# Patient Record
Sex: MALE | Race: WHITE | NOT HISPANIC OR LATINO | Employment: PART TIME | URBAN - METROPOLITAN AREA
[De-identification: names, ages, dates, MRNs, and addresses within clinical notes are randomized per-mention and may not be internally consistent; named-entity substitution may affect disease eponyms.]

---

## 2017-03-06 ENCOUNTER — TRANSCRIBE ORDERS (OUTPATIENT)
Dept: ADMINISTRATIVE | Age: 67
End: 2017-03-06

## 2017-03-06 ENCOUNTER — APPOINTMENT (OUTPATIENT)
Dept: LAB | Age: 67
End: 2017-03-06
Payer: COMMERCIAL

## 2017-03-06 DIAGNOSIS — E78.2 MIXED HYPERLIPIDEMIA: ICD-10-CM

## 2017-03-06 DIAGNOSIS — E78.2 MIXED HYPERLIPIDEMIA: Primary | ICD-10-CM

## 2017-03-06 LAB
ALBUMIN SERPL BCP-MCNC: 3.7 G/DL (ref 3.5–5)
ALP SERPL-CCNC: 85 U/L (ref 46–116)
ALT SERPL W P-5'-P-CCNC: 47 U/L (ref 12–78)
ANION GAP SERPL CALCULATED.3IONS-SCNC: 7 MMOL/L (ref 4–13)
AST SERPL W P-5'-P-CCNC: 22 U/L (ref 5–45)
BILIRUB SERPL-MCNC: 0.34 MG/DL (ref 0.2–1)
BUN SERPL-MCNC: 14 MG/DL (ref 5–25)
CALCIUM SERPL-MCNC: 9.5 MG/DL (ref 8.3–10.1)
CHLORIDE SERPL-SCNC: 102 MMOL/L (ref 100–108)
CHOLEST SERPL-MCNC: 163 MG/DL (ref 50–200)
CO2 SERPL-SCNC: 28 MMOL/L (ref 21–32)
CREAT SERPL-MCNC: 0.93 MG/DL (ref 0.6–1.3)
GFR SERPL CREATININE-BSD FRML MDRD: >60 ML/MIN/1.73SQ M
GLUCOSE SERPL-MCNC: 91 MG/DL (ref 65–140)
HDLC SERPL-MCNC: 39 MG/DL (ref 40–60)
LDLC SERPL CALC-MCNC: 103 MG/DL (ref 0–100)
POTASSIUM SERPL-SCNC: 4.1 MMOL/L (ref 3.5–5.3)
PROT SERPL-MCNC: 7.8 G/DL (ref 6.4–8.2)
SODIUM SERPL-SCNC: 137 MMOL/L (ref 136–145)
TRIGL SERPL-MCNC: 103 MG/DL

## 2017-03-06 PROCEDURE — 80053 COMPREHEN METABOLIC PANEL: CPT

## 2017-03-06 PROCEDURE — 36415 COLL VENOUS BLD VENIPUNCTURE: CPT

## 2017-03-06 PROCEDURE — 80061 LIPID PANEL: CPT

## 2017-11-09 ENCOUNTER — APPOINTMENT (OUTPATIENT)
Dept: LAB | Age: 67
End: 2017-11-09
Payer: COMMERCIAL

## 2017-11-09 ENCOUNTER — TRANSCRIBE ORDERS (OUTPATIENT)
Dept: ADMINISTRATIVE | Age: 67
End: 2017-11-09

## 2017-11-09 DIAGNOSIS — E29.1 3-OXO-5 ALPHA-STEROID DELTA 4-DEHYDROGENASE DEFICIENCY: ICD-10-CM

## 2017-11-09 DIAGNOSIS — E29.1 3-OXO-5 ALPHA-STEROID DELTA 4-DEHYDROGENASE DEFICIENCY: Primary | ICD-10-CM

## 2017-11-09 LAB
ALBUMIN SERPL BCP-MCNC: 3.7 G/DL (ref 3.5–5)
ALP SERPL-CCNC: 84 U/L (ref 46–116)
ALT SERPL W P-5'-P-CCNC: 87 U/L (ref 12–78)
ANION GAP SERPL CALCULATED.3IONS-SCNC: 7 MMOL/L (ref 4–13)
AST SERPL W P-5'-P-CCNC: 61 U/L (ref 5–45)
BILIRUB DIRECT SERPL-MCNC: 0.11 MG/DL (ref 0–0.2)
BILIRUB SERPL-MCNC: 0.37 MG/DL (ref 0.2–1)
BUN SERPL-MCNC: 19 MG/DL (ref 5–25)
CALCIUM SERPL-MCNC: 9.1 MG/DL (ref 8.3–10.1)
CHLORIDE SERPL-SCNC: 104 MMOL/L (ref 100–108)
CHOLEST SERPL-MCNC: 197 MG/DL (ref 50–200)
CO2 SERPL-SCNC: 27 MMOL/L (ref 21–32)
CREAT SERPL-MCNC: 0.95 MG/DL (ref 0.6–1.3)
ERYTHROCYTE [DISTWIDTH] IN BLOOD BY AUTOMATED COUNT: 13.5 % (ref 11.6–15.1)
FSH SERPL-ACNC: 4.9 MIU/ML (ref 0.7–10.8)
GFR SERPL CREATININE-BSD FRML MDRD: 82 ML/MIN/1.73SQ M
GLUCOSE P FAST SERPL-MCNC: 84 MG/DL (ref 65–99)
HCT VFR BLD AUTO: 45.1 % (ref 36.5–49.3)
HDLC SERPL-MCNC: 34 MG/DL (ref 40–60)
HGB BLD-MCNC: 14.6 G/DL (ref 12–17)
LDLC SERPL CALC-MCNC: 136 MG/DL (ref 0–100)
LH SERPL-ACNC: 5.3 MIU/ML (ref 1.2–10.6)
MCH RBC QN AUTO: 26.7 PG (ref 26.8–34.3)
MCHC RBC AUTO-ENTMCNC: 32.4 G/DL (ref 31.4–37.4)
MCV RBC AUTO: 83 FL (ref 82–98)
PLATELET # BLD AUTO: 172 THOUSANDS/UL (ref 149–390)
PMV BLD AUTO: 11.6 FL (ref 8.9–12.7)
POTASSIUM SERPL-SCNC: 4.7 MMOL/L (ref 3.5–5.3)
PROLACTIN SERPL-MCNC: 5.3 NG/ML (ref 2.5–17.4)
PROT SERPL-MCNC: 7.6 G/DL (ref 6.4–8.2)
PSA SERPL-MCNC: 0.8 NG/ML (ref 0–4)
RBC # BLD AUTO: 5.46 MILLION/UL (ref 3.88–5.62)
SODIUM SERPL-SCNC: 138 MMOL/L (ref 136–145)
TRIGL SERPL-MCNC: 134 MG/DL
WBC # BLD AUTO: 3.66 THOUSAND/UL (ref 4.31–10.16)

## 2017-11-09 PROCEDURE — 84270 ASSAY OF SEX HORMONE GLOBUL: CPT

## 2017-11-09 PROCEDURE — 80076 HEPATIC FUNCTION PANEL: CPT

## 2017-11-09 PROCEDURE — 84153 ASSAY OF PSA TOTAL: CPT

## 2017-11-09 PROCEDURE — 83001 ASSAY OF GONADOTROPIN (FSH): CPT

## 2017-11-09 PROCEDURE — 84403 ASSAY OF TOTAL TESTOSTERONE: CPT

## 2017-11-09 PROCEDURE — 80061 LIPID PANEL: CPT

## 2017-11-09 PROCEDURE — 83002 ASSAY OF GONADOTROPIN (LH): CPT

## 2017-11-09 PROCEDURE — 80048 BASIC METABOLIC PNL TOTAL CA: CPT

## 2017-11-09 PROCEDURE — 84402 ASSAY OF FREE TESTOSTERONE: CPT

## 2017-11-09 PROCEDURE — 84146 ASSAY OF PROLACTIN: CPT

## 2017-11-09 PROCEDURE — 85027 COMPLETE CBC AUTOMATED: CPT

## 2017-11-09 PROCEDURE — 36415 COLL VENOUS BLD VENIPUNCTURE: CPT

## 2017-11-10 LAB — SHBG SERPL-SCNC: 60.7 NMOL/L (ref 19.3–76.4)

## 2017-11-11 LAB
TESTOST FREE SERPL-MCNC: 12.3 PG/ML (ref 6.6–18.1)
TESTOST SERPL-MCNC: 712 NG/DL (ref 264–916)

## 2019-02-13 ENCOUNTER — LAB (OUTPATIENT)
Dept: LAB | Facility: CLINIC | Age: 69
End: 2019-02-13
Payer: COMMERCIAL

## 2019-02-13 ENCOUNTER — OFFICE VISIT (OUTPATIENT)
Dept: ENDOCRINOLOGY | Facility: CLINIC | Age: 69
End: 2019-02-13
Payer: COMMERCIAL

## 2019-02-13 VITALS
BODY MASS INDEX: 35.57 KG/M2 | SYSTOLIC BLOOD PRESSURE: 148 MMHG | HEART RATE: 91 BPM | HEIGHT: 71 IN | DIASTOLIC BLOOD PRESSURE: 100 MMHG

## 2019-02-13 DIAGNOSIS — E23.0 HYPOGONADOTROPIC HYPOGONADISM (HCC): Primary | ICD-10-CM

## 2019-02-13 DIAGNOSIS — N62 GYNECOMASTIA: ICD-10-CM

## 2019-02-13 DIAGNOSIS — E55.9 VITAMIN D DEFICIENCY: ICD-10-CM

## 2019-02-13 DIAGNOSIS — G47.33 OBSTRUCTIVE SLEEP APNEA: ICD-10-CM

## 2019-02-13 DIAGNOSIS — E23.0 HYPOGONADOTROPIC HYPOGONADISM (HCC): ICD-10-CM

## 2019-02-13 LAB
25(OH)D3 SERPL-MCNC: 27.9 NG/ML (ref 30–100)
ALBUMIN SERPL BCP-MCNC: 3.9 G/DL (ref 3.5–5)
ALP SERPL-CCNC: 91 U/L (ref 46–116)
ALT SERPL W P-5'-P-CCNC: 114 U/L (ref 12–78)
ANION GAP SERPL CALCULATED.3IONS-SCNC: 5 MMOL/L (ref 4–13)
AST SERPL W P-5'-P-CCNC: 67 U/L (ref 5–45)
BASOPHILS # BLD AUTO: 0.03 THOUSANDS/ΜL (ref 0–0.1)
BASOPHILS NFR BLD AUTO: 1 % (ref 0–1)
BILIRUB SERPL-MCNC: 0.52 MG/DL (ref 0.2–1)
BUN SERPL-MCNC: 14 MG/DL (ref 5–25)
CALCIUM SERPL-MCNC: 9.4 MG/DL (ref 8.3–10.1)
CHLORIDE SERPL-SCNC: 104 MMOL/L (ref 100–108)
CHOLEST SERPL-MCNC: 192 MG/DL (ref 50–200)
CO2 SERPL-SCNC: 29 MMOL/L (ref 21–32)
CREAT SERPL-MCNC: 1 MG/DL (ref 0.6–1.3)
EOSINOPHIL # BLD AUTO: 0.06 THOUSAND/ΜL (ref 0–0.61)
EOSINOPHIL NFR BLD AUTO: 1 % (ref 0–6)
ERYTHROCYTE [DISTWIDTH] IN BLOOD BY AUTOMATED COUNT: 13.2 % (ref 11.6–15.1)
ESTRADIOL SERPL-MCNC: 63 PG/ML (ref 11–52.5)
GFR SERPL CREATININE-BSD FRML MDRD: 77 ML/MIN/1.73SQ M
GLUCOSE P FAST SERPL-MCNC: 102 MG/DL (ref 65–99)
HCT VFR BLD AUTO: 48.6 % (ref 36.5–49.3)
HDLC SERPL-MCNC: 35 MG/DL (ref 40–60)
HGB BLD-MCNC: 15.5 G/DL (ref 12–17)
IMM GRANULOCYTES # BLD AUTO: 0 THOUSAND/UL (ref 0–0.2)
IMM GRANULOCYTES NFR BLD AUTO: 0 % (ref 0–2)
LDLC SERPL CALC-MCNC: 136 MG/DL (ref 0–100)
LYMPHOCYTES # BLD AUTO: 1.42 THOUSANDS/ΜL (ref 0.6–4.47)
LYMPHOCYTES NFR BLD AUTO: 32 % (ref 14–44)
MCH RBC QN AUTO: 26.3 PG (ref 26.8–34.3)
MCHC RBC AUTO-ENTMCNC: 31.9 G/DL (ref 31.4–37.4)
MCV RBC AUTO: 82 FL (ref 82–98)
MONOCYTES # BLD AUTO: 0.46 THOUSAND/ΜL (ref 0.17–1.22)
MONOCYTES NFR BLD AUTO: 10 % (ref 4–12)
NEUTROPHILS # BLD AUTO: 2.51 THOUSANDS/ΜL (ref 1.85–7.62)
NEUTS SEG NFR BLD AUTO: 56 % (ref 43–75)
NONHDLC SERPL-MCNC: 157 MG/DL
NRBC BLD AUTO-RTO: 0 /100 WBCS
PLATELET # BLD AUTO: 181 THOUSANDS/UL (ref 149–390)
PMV BLD AUTO: 11.6 FL (ref 8.9–12.7)
POTASSIUM SERPL-SCNC: 4.6 MMOL/L (ref 3.5–5.3)
PROT SERPL-MCNC: 8 G/DL (ref 6.4–8.2)
PSA SERPL-MCNC: 0.9 NG/ML (ref 0–4)
RBC # BLD AUTO: 5.9 MILLION/UL (ref 3.88–5.62)
SODIUM SERPL-SCNC: 138 MMOL/L (ref 136–145)
T4 FREE SERPL-MCNC: 0.75 NG/DL (ref 0.76–1.46)
TRIGL SERPL-MCNC: 103 MG/DL
TSH SERPL DL<=0.05 MIU/L-ACNC: 1.85 UIU/ML (ref 0.36–3.74)
WBC # BLD AUTO: 4.48 THOUSAND/UL (ref 4.31–10.16)

## 2019-02-13 PROCEDURE — 36415 COLL VENOUS BLD VENIPUNCTURE: CPT

## 2019-02-13 PROCEDURE — 84403 ASSAY OF TOTAL TESTOSTERONE: CPT

## 2019-02-13 PROCEDURE — 84439 ASSAY OF FREE THYROXINE: CPT

## 2019-02-13 PROCEDURE — 84402 ASSAY OF FREE TESTOSTERONE: CPT

## 2019-02-13 PROCEDURE — 84443 ASSAY THYROID STIM HORMONE: CPT

## 2019-02-13 PROCEDURE — 80053 COMPREHEN METABOLIC PANEL: CPT

## 2019-02-13 PROCEDURE — 80061 LIPID PANEL: CPT

## 2019-02-13 PROCEDURE — 82306 VITAMIN D 25 HYDROXY: CPT

## 2019-02-13 PROCEDURE — 85025 COMPLETE CBC W/AUTO DIFF WBC: CPT

## 2019-02-13 PROCEDURE — G0103 PSA SCREENING: HCPCS

## 2019-02-13 PROCEDURE — 82670 ASSAY OF TOTAL ESTRADIOL: CPT

## 2019-02-13 PROCEDURE — 99204 OFFICE O/P NEW MOD 45 MIN: CPT | Performed by: INTERNAL MEDICINE

## 2019-02-13 RX ORDER — METOPROLOL SUCCINATE 25 MG/1
200 TABLET, EXTENDED RELEASE ORAL
COMMUNITY
Start: 2018-12-27

## 2019-02-13 NOTE — PROGRESS NOTES
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[facility] )  He has been on Clomid for 3 to 4 years  Check testosterone and estradiol today  I have asked him to continue Clomid 25 mg per day  He would like to keep his testosterone level around 700 and his estradiol level around 30  If the estradiol level is greater than 60, I would consider using Arimidex  Obstructive sleep apnea  Based on his neck size and stop [employer], he has obstructive sleep apnea  I have referred him to the sleep specialist     Vitamin D deficiency  Check 25 hydroxy vitamin-D level  Gynecomastia  There is small amount of gynecomastia present left greater than right  Continue to monitor over time  Diagnoses and all orders for this visit:    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[facility] )  -     PSA, total screen Lab Collect; Future  -     Lipid panel Lab Collect Lab Collect; Future  -     Comprehensive metabolic panel Lab Collect; Future  -     CBC and differential Lab Collect; Future  -     Estradiol; Future  -     Ambulatory referral to Internal Medicine; Future  -     clomiPHENE (CLOMID) 50 mg tablet; Take 0 5 tablets (25 mg total) by mouth daily    Obstructive sleep apnea  -     Ambulatory referral to Sleep Medicine; Future  -     Ambulatory referral to Internal Medicine; Future    Vitamin D deficiency  -     Vitamin D 25 hydroxy Lab Collect; Future  -     Ambulatory referral to Internal Medicine; Future    Gynecomastia  -     Testosterone, free, total Lab Collect; Future  -     T4, free Lab Collect; Future  -     TSH, 3rd generation Lab Collect; Future  -     Estradiol; Future  -     Ambulatory referral to Internal Medicine; Future    Other orders  -     Discontinue: clomiPHENE (CLOMID) 50 mg tablet; Take 50 mg by mouth  -     metoprolol succinate (TOPROL-XL) 25 mg 24 hr tablet; TAKE 1 TABLET BY MOUTH EVERY DAY  -     Coenzyme Q10-Vitamin E 100-100 MG-UNIT CAPS;  Take 100-200 mg by mouth          Subjective:      Patient ID: Yuri Arzate is a 76 y o  male  69-year-old male with hypogonadotropic hypogonadism for 7 to 8 years which has been treated with Clomid for about four years  He is currently taking 50 mg every other day  He denies any difficulty with erectile dysfunction  He is concerned about elevated estradiol levels which can occur with increasing testosterone  He also states that he has some gynecomastia although this does not appear to be bothersome  He denies any difficulty with endurance and he does exercise on a regular basis  In terms of sleep apnea, his neck circumference is almost 19 in  He does snore and has nightmares  The following portions of the patient's history were reviewed and updated as appropriate: allergies, current medications, past family history, past medical history, past social history, past surgical history and problem list     Review of Systems   Constitutional: Negative for chills and fever  Respiratory: Negative for shortness of breath  Cardiovascular: Negative for chest pain  Gastrointestinal: Negative for constipation, diarrhea, nausea and vomiting  Endocrine: Negative for polydipsia and polyuria  Neurological: Negative for numbness  All other systems reviewed and are negative  Objective:      /100   Pulse 91   Ht 5' 11" (1 803 m)   BMI 35 57 kg/m²          Physical Exam   Constitutional: He is oriented to person, place, and time  He appears well-developed and well-nourished  No distress  HENT:   Head: Normocephalic and atraumatic  Mouth/Throat: Oropharynx is clear and moist and mucous membranes are normal  No oropharyngeal exudate  Eyes: Conjunctivae, EOM and lids are normal  Right eye exhibits no discharge  Left eye exhibits no discharge  No scleral icterus  Neck: Neck supple  No thyromegaly present  Cardiovascular: Normal rate, regular rhythm and normal heart sounds  Exam reveals no gallop and no friction rub     No murmur heard   Pulmonary/Chest: Effort normal and breath sounds normal  No respiratory distress  He has no wheezes  He does have gynecomastia left greater than right  Abdominal: Soft  Bowel sounds are normal  He exhibits no distension  There is no tenderness  Musculoskeletal: Normal range of motion  He exhibits no edema, tenderness or deformity  Lymphadenopathy:        Head (right side): No occipital adenopathy present  Head (left side): No occipital adenopathy present  Right: No supraclavicular adenopathy present  Left: No supraclavicular adenopathy present  Neurological: He is alert and oriented to person, place, and time  No cranial nerve deficit  Coordination normal    Skin: Skin is warm and intact  No rash noted  He is not diaphoretic  No erythema  He has multiple skin tags  Psychiatric: He has a normal mood and affect  His behavior is normal    Vitals reviewed

## 2019-02-13 NOTE — ASSESSMENT & PLAN NOTE
Based on his neck size and stop [employer], he has obstructive sleep apnea    I have referred him to the sleep specialist

## 2019-02-13 NOTE — ASSESSMENT & PLAN NOTE
There is small amount of gynecomastia present left greater than right  Continue to monitor over time

## 2019-02-13 NOTE — LETTER
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[facility] )  He has been on Clomid for 3 to 4 years  Check testosterone and estradiol today  I have asked him to continue Clomid 25 mg per day  He would like to keep his testosterone level around 700 and his estradiol level around 30  If the estradiol level is greater than 60, I would consider using Arimidex  Obstructive sleep apnea  Based on his neck size and stop [employer], he has obstructive sleep apnea  I have referred him to the sleep specialist     Vitamin D deficiency  Check 25 hydroxy vitamin-D level  Gynecomastia  There is small amount of gynecomastia present left greater than right  Continue to monitor over time  Diagnoses and all orders for this visit:    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[facility] )  -     PSA, total screen Lab Collect; Future  -     Lipid panel Lab Collect Lab Collect; Future  -     Comprehensive metabolic panel Lab Collect; Future  -     CBC and differential Lab Collect; Future  -     Estradiol; Future  -     Ambulatory referral to Internal Medicine; Future  -     clomiPHENE (CLOMID) 50 mg tablet; Take 0 5 tablets (25 mg total) by mouth daily    Obstructive sleep apnea  -     Ambulatory referral to Sleep Medicine; Future  -     Ambulatory referral to Internal Medicine; Future    Vitamin D deficiency  -     Vitamin D 25 hydroxy Lab Collect;  Future  -     Ambulatory referral to Internal Medicine; Future    Gynecomastia  -     Testosterone, free, total Lab Collect; Future  -     T4, free Lab Collect; Future  -     TSH, 3rd generation Lab Collect; Future  -     Estradiol; Future  -     Ambulatory referral to Internal Medicine; Future    Other orders  -     Discontinue: clomiPHENE (CLOMID) 50 mg tablet; Take 50 mg by mouth  -     metoprolol succinate (TOPROL-XL) 25 mg 24 hr tablet; TAKE 1 TABLET BY MOUTH EVERY DAY  -     Coenzyme Q10-Vitamin E 100-100 MG-UNIT CAPS; Take 100-200 mg by mouth          Subjective:      Patient ID: Selma Bocanegra is a 76 y o  male  43-year-old male with hypogonadotropic hypogonadism for 7 to 8 years which has been treated with Clomid for about four years  He is currently taking 50 mg every other day  He denies any difficulty with erectile dysfunction  He is concerned about elevated estradiol levels which can occur with increasing testosterone  He also states that he has some gynecomastia although this does not appear to be bothersome  He denies any difficulty with endurance and he does exercise on a regular basis  In terms of sleep apnea, his neck circumference is almost 19 in  He does snore and has nightmares  The following portions of the patient's history were reviewed and updated as appropriate: allergies, current medications, past family history, past medical history, past social history, past surgical history and problem list     Review of Systems   Constitutional: Negative for chills and fever  Respiratory: Negative for shortness of breath  Cardiovascular: Negative for chest pain  Gastrointestinal: Negative for constipation, diarrhea, nausea and vomiting  Endocrine: Negative for polydipsia and polyuria  Neurological: Negative for numbness  All other systems reviewed and are negative          Objective:      /100   Pulse 91   Ht 5' 11" (1 803 m)   BMI 35 57 kg/m²           Physical Exam   Constitutional: He is oriented to person, place, and time  He appears well-developed and well-nourished  No distress  HENT:   Head: Normocephalic and atraumatic  Mouth/Throat: Oropharynx is clear and moist and mucous membranes are normal  No oropharyngeal exudate  Eyes: Conjunctivae, EOM and lids are normal  Right eye exhibits no discharge  Left eye exhibits no discharge  No scleral icterus  Neck: Neck supple  No thyromegaly present  Cardiovascular: Normal rate, regular rhythm and normal heart sounds  Exam reveals no gallop and no friction rub  No murmur heard  Pulmonary/Chest: Effort normal and breath sounds normal  No respiratory distress  He has no wheezes  He does have gynecomastia left greater than right  Abdominal: Soft  Bowel sounds are normal  He exhibits no distension  There is no tenderness  Musculoskeletal: Normal range of motion  He exhibits no edema, tenderness or deformity  Lymphadenopathy:        Head (right side): No occipital adenopathy present  Head (left side): No occipital adenopathy present  Right: No supraclavicular adenopathy present  Left: No supraclavicular adenopathy present  Neurological: He is alert and oriented to person, place, and time  No cranial nerve deficit  Coordination normal    Skin: Skin is warm and intact  No rash noted  He is not diaphoretic  No erythema  He has multiple skin tags  Psychiatric: He has a normal mood and affect  His behavior is normal    Vitals reviewed

## 2019-02-13 NOTE — ASSESSMENT & PLAN NOTE
He has been on Clomid for 3 to 4 years  Check testosterone and estradiol today  I have asked him to continue Clomid 25 mg per day  He would like to keep his testosterone level around 700 and his estradiol level around 30  If the estradiol level is greater than 60, I would consider using Arimidex

## 2019-02-14 LAB
TESTOST FREE SERPL-MCNC: 13.5 PG/ML (ref 6.6–18.1)
TESTOST SERPL-MCNC: 853 NG/DL (ref 264–916)

## 2019-02-14 NOTE — RESULT ENCOUNTER NOTE
Testosterone level is in range  Estradiol is elevated  Wait to start anastrazole until liver tests are worked up  Liver tests are elevated  Recommend US of liver and acute hepatitis panel  LDL is high and HDL is low  Would want to work up liver test before considering medications  The TSH is normal but the free T4 is low  Start levothyroxine 50 mcg per day  Check TSH and free T4 in 6 weeks  Sent to my chart

## 2019-02-15 ENCOUNTER — TELEPHONE (OUTPATIENT)
Dept: ENDOCRINOLOGY | Facility: CLINIC | Age: 69
End: 2019-02-15

## 2019-02-15 DIAGNOSIS — N62 GYNECOMASTIA: Primary | ICD-10-CM

## 2019-02-15 DIAGNOSIS — E23.0 HYPOGONADOTROPIC HYPOGONADISM (HCC): ICD-10-CM

## 2019-02-15 RX ORDER — LEVOTHYROXINE SODIUM 0.05 MG/1
50 TABLET ORAL DAILY
Qty: 30 TABLET | Refills: 2 | Status: SHIPPED | OUTPATIENT
Start: 2019-02-15 | End: 2021-08-09 | Stop reason: ALTCHOICE

## 2019-02-15 NOTE — TELEPHONE ENCOUNTER
Spoke to pt, provided lab results, prescription for levothyroxine sent to pharmacy, lab slip placed in mail to recheck TSH and free T4 in 6 weeks

## 2019-02-15 NOTE — TELEPHONE ENCOUNTER
----- Message from Soni Triana MD sent at 2/14/2019  3:25 PM EST -----  Testosterone level is in range  Estradiol is elevated  Wait to start anastrazole until liver tests are worked up  Liver tests are elevated  Recommend US of liver and acute hepatitis panel  LDL is high and HDL is low  Would want to work up liver test before considering medications  The TSH is normal but the free T4 is low  Start levothyroxine 50 mcg per day  Check TSH and free T4 in 6 weeks  Sent to my chart

## 2019-02-19 ENCOUNTER — TELEPHONE (OUTPATIENT)
Dept: ENDOCRINOLOGY | Facility: CLINIC | Age: 69
End: 2019-02-19

## 2019-02-19 NOTE — TELEPHONE ENCOUNTER
recived fax from [place], [place] needed for Clomid    Submitted PA via cover my meds  Key #: [id number]

## 2019-02-22 ENCOUNTER — TELEPHONE (OUTPATIENT)
Dept: ENDOCRINOLOGY | Facility: CLINIC | Age: 69
End: 2019-02-22

## 2019-02-22 NOTE — TELEPHONE ENCOUNTER
Pt called and wanted to know if the levothyroxine 50 mcg can cause rapid pulse  He stated that his pulse has been running in 110-120  It will come down a few hours after taking metoprolol   This morning his pulse was 70    He did state that he has an appt with cardiologist next wed 2/20 but wanted to get Dr Isaacs Anchors input

## 2019-02-22 NOTE — TELEPHONE ENCOUNTER
If levothyroxine can certainly increase heart rate but it does not increases significantly  I would recommend taking the metoprolol and levothyroxine together

## 2019-02-25 ENCOUNTER — TELEPHONE (OUTPATIENT)
Dept: DERMATOLOGY | Facility: CLINIC | Age: 69
End: 2019-02-25

## 2019-02-26 ENCOUNTER — TELEPHONE (OUTPATIENT)
Dept: ENDOCRINOLOGY | Facility: CLINIC | Age: 69
End: 2019-02-26

## 2019-02-26 DIAGNOSIS — E23.0 HYPOGONADOTROPIC HYPOGONADISM (HCC): ICD-10-CM

## 2019-02-26 NOTE — TELEPHONE ENCOUNTER
Patient left a message with the answering service asking for a call back regarding elevated liver enzymes and what he needs to do to level them out  He also needs a script for Aramatose Inhibiter (sp?)  Please call him at [phone]  Thank you

## 2019-02-26 NOTE — TELEPHONE ENCOUNTER
Spoke to pt, he wanted to know if you will be prescribing medication for elevated liver enzymes? He stated that you had spoken to him about the possibility of starting medication based on lab results

## 2019-02-27 DIAGNOSIS — N62 GYNECOMASTIA: ICD-10-CM

## 2019-02-27 DIAGNOSIS — E23.0 HYPOGONADOTROPIC HYPOGONADISM (HCC): Primary | ICD-10-CM

## 2019-02-27 RX ORDER — ANASTROZOLE 1 MG/1
1 TABLET ORAL DAILY
Qty: 90 TABLET | Refills: 1 | Status: SHIPPED | OUTPATIENT
Start: 2019-02-27 | End: 2020-09-28 | Stop reason: SDUPTHER

## 2019-02-27 NOTE — TELEPHONE ENCOUNTER
Called and spoke to pt, advised that Dr Christophe Reyes prescribed 1 mg of Arimidex per day    Pt asked to have prescription sent to [facility]

## 2019-02-27 NOTE — TELEPHONE ENCOUNTER
At this time, I would recommend doing the ultrasound of the liver and hepatitis panel that was ordered  Once we have the results of these, we can decide how to proceed further

## 2019-02-28 NOTE — TELEPHONE ENCOUNTER
Spoke to pt, advised to have labs and US completed  Also Dr Vanessa Flores there is message from 2/25 that   We can prescribe 1 mg of Arimidex per day  He can have 90 day supply with one refill    Prescription has been sent to pharmacy

## 2019-03-01 ENCOUNTER — OFFICE VISIT (OUTPATIENT)
Dept: SLEEP CENTER | Facility: CLINIC | Age: 69
End: 2019-03-01
Payer: COMMERCIAL

## 2019-03-01 ENCOUNTER — TELEPHONE (OUTPATIENT)
Dept: SLEEP CENTER | Facility: CLINIC | Age: 69
End: 2019-03-01

## 2019-03-01 VITALS
BODY MASS INDEX: 35.57 KG/M2 | HEART RATE: 84 BPM | HEIGHT: 71 IN | SYSTOLIC BLOOD PRESSURE: 132 MMHG | DIASTOLIC BLOOD PRESSURE: 82 MMHG

## 2019-03-01 DIAGNOSIS — R53.83 FATIGUE, UNSPECIFIED TYPE: ICD-10-CM

## 2019-03-01 DIAGNOSIS — Z72.821 POOR SLEEP HYGIENE: ICD-10-CM

## 2019-03-01 DIAGNOSIS — G47.00 INSOMNIA, UNSPECIFIED TYPE: ICD-10-CM

## 2019-03-01 DIAGNOSIS — R06.83 SNORING: Primary | ICD-10-CM

## 2019-03-01 DIAGNOSIS — G47.00 FREQUENT NOCTURNAL AWAKENING: ICD-10-CM

## 2019-03-01 DIAGNOSIS — G47.8 NON-RESTORATIVE SLEEP: ICD-10-CM

## 2019-03-01 DIAGNOSIS — G47.33 OBSTRUCTIVE SLEEP APNEA: ICD-10-CM

## 2019-03-01 PROCEDURE — 99204 OFFICE O/P NEW MOD 45 MIN: CPT | Performed by: PSYCHIATRY & NEUROLOGY

## 2019-03-01 RX ORDER — ATORVASTATIN CALCIUM 20 MG/1
80 TABLET, FILM COATED ORAL DAILY
COMMUNITY

## 2019-03-01 NOTE — PROGRESS NOTES
Sleep Medicine Consultation Note    HPI:  Mr Edwin Sarkar is a 76 y o  male with anxiety and HTN not on CPAP who is being seen for an evaluation od sleep disordered breathing in the context of daytime sleepiness and low energy  The patient stated that he has trouble sleeping  He has had insomnia since he was a teenager which he was afraid he was unable to fall asleep and was worried about not sleeping  In his 45s this improved with paxil and ativan  In the last 5 years he has found that he wakes up frequently throughout the night  He also low energy and tiredness throughout the day  This has also worsened over the years  He thought it had to do with aging  Please see below for continuation of the HPI:      Sleep Disordered Breathing:  -Snoring: yes it wakes him up at times   -Severity: loud   -Frequency: every night   -Duration: since his 45s   -Over time: unsure   -Modifying factors: unsure  -Observed Apneas: denied  -Mouth Breathing at night: yes  -Dry Mouth in morning: yes   -Nocturnal Gasping: sometimes  -Nasal Obstruction: yes  -Weight: gained 40 lbs in a matter of 2-3 years    Sleep Pattern:  -Location: bedroom  -Bed/Recliner/Wedge: bed  -Bed Partner: no  -HOB: flat  -# of pillows under head: 2  -Position: back and side  -Bedtime: 230am  -Lights out: yes  -Environmental: TV on all night  -Latency: 30 mins with meds   -Awakenings: 3   -Reason: TV or finds himself just awake   -Duration: 30-60 mins  -Wake time: 11am   -Alarm: no  -Rise time: 11am  -Weekends: same schedule  -Shift Work: only on weekends 1-9p  -Patient's estimate of total sleep time: 7-8h    Daytime Symptoms:  -Upon Awakening: tired   -Daytime fatigue/sleepiness: somewhat fatigued  Better in the gym  Gets sleepy in the afternoon    -Naps: sometimes will nap in the after noon  -Involuntary Dozing: after a meal when he lies down  Watching Tv  -Cognitive Symptoms: trouble with memory but this is long standing   If he is anxious, he finds it difficult to concentrate  -Driving: Difficulty with sleepiness and driving:  Sometimes gets sleepy while driving during the day for an hour or more  He will pull over if he is feeling sleepy     -- Close calls related to sleepiness: one time he fell asleep and went off the road   -- Accidents related to sleepiness: denied      Questionnaires: Sitting and reading: High chance of dozing  Watching TV: High chance of dozing  Sitting, inactive in a public place (e g  a theatre or a meeting): Slight chance of dozing  As a passenger in a car for an hour without a break: Slight chance of dozing  Lying down to rest in the afternoon when circumstances permit: High chance of dozing  Sitting and talking to someone: Would never doze  Sitting quietly after a lunch without alcohol: Slight chance of dozing  In a car, while stopped for a few minutes in traffic: Would never doze  Total score: 12      Sleep Review of Symptoms:  -Parasomnias:  --Sleep Walking: denied  --Dream Enactment: sometimes finds himself punching a few times a month if he is dreaming about fighting    --Bruxism: unsure  -Motor:  --RLS: denied  --PLMS: denied  -Narcolepsy:  --Hallucinations: denied  --Paralysis: unsure, thinks he felt this a long time ago  --Cataplexy: denied    Childhood Sleep History: insomnia    Prior Sleep Studies/Evaluations:  denied    Family History:  Family history of sleep disorders: paternal and maternal uncles snoring    Patient Active Problem List   Diagnosis    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[facility] [address] )    Obstructive sleep apnea    Vitamin D deficiency    Gynecomastia   hypothyroidism   Atrial fibrillation-in remission  SVT- in remission    Past Medical History:   Diagnosis Date    Anxiety     Hypertension      --> Seizure hx: denies  --> Head injury with LOC: fell down the stairs and lost consciousness at the age of 3  --> Supplemental Oxygen Use: denies    Labs   Results for Maria A Shepherd (MRN [id number]) as of 3/1/2019 15:34 Ref  Range 2/13/2019 14:19   eGFR Latest Units: ml/min/1 73sq m 77   Sodium Latest Ref Range: 136 - 145 mmol/L 138   Potassium Latest Ref Range: 3 5 - 5 3 mmol/L 4 6   Chloride Latest Ref Range: 100 - 108 mmol/L 104   CO2 Latest Ref Range: 21 - 32 mmol/L 29   Anion Gap Latest Ref Range: 4 - 13 mmol/L 5   BUN Latest Ref Range: 5 - 25 mg/dL 14   Creatinine Latest Ref Range: 0 60 - 1 30 mg/dL 1 00   GLUCOSE FASTING Latest Ref Range: 65 - 99 mg/dL 102 (H)   Calcium Latest Ref Range: 8 3 - 10 1 mg/dL 9 4   AST Latest Ref Range: 5 - 45 U/L 67 (H)   ALT Latest Ref Range: 12 - 78 U/L 114 (H)   Alkaline Phosphatase Latest Ref Range: 46 - 116 U/L 91   Total Protein Latest Ref Range: 6 4 - 8 2 g/dL 8 0   Albumin Latest Ref Range: 3 5 - 5 0 g/dL 3 9   TOTAL BILIRUBIN Latest Ref Range: 0 20 - 1 00 mg/dL 0 52   Cholesterol Latest Ref Range: 50 - 200 mg/dL 192   Triglycerides Latest Ref Range: <=150 mg/dL 103   HDL Latest Ref Range: 40 - 60 mg/dL 35 (L)   Non-HDL Cholesterol Latest Units: mg/dl 157   LDL Direct Latest Ref Range: 0 - 100 mg/dL 136 (H)   Vit D, 25-Hydroxy Latest Ref Range: 30 0 - 100 0 ng/mL 27 9 (L)   WBC Latest Ref Range: 4 31 - 10 16 Thousand/uL 4 48   Red Blood Cell Count Latest Ref Range: 3 88 - 5 62 Million/uL 5 90 (H)   Hemoglobin Latest Ref Range: 12 0 - 17 0 g/dL 15 5   HCT Latest Ref Range: 36 5 - 49 3 % 48 6   MCV Latest Ref Range: 82 - 98 fL 82   MCH Latest Ref Range: 26 8 - 34 3 pg 26 3 (L)   MCHC Latest Ref Range: 31 4 - 37 4 g/dL 31 9   RDW Latest Ref Range: 11 6 - 15 1 % 13 2   Platelet Count Latest Ref Range: 149 - 390 Thousands/uL 181   MPV Latest Ref Range: 8 9 - 12 7 fL 11 6   nRBC Latest Units: /100 WBCs 0   Neutrophils % Latest Ref Range: 43 - 75 % 56   Immat GRANS % Latest Ref Range: 0 - 2 % 0   Lymphocytes Relative Latest Ref Range: 14 - 44 % 32   Monocytes Relative Latest Ref Range: 4 - 12 % 10   Eosinophils Latest Ref Range: 0 - 6 % 1   Basophils Relative Latest Ref Range: 0 - 1 % 1 Immature Grans Absolute Latest Ref Range: 0 00 - 0 20 Thousand/uL 0 00   Absolute Neutrophils Latest Ref Range: 1 85 - 7 62 Thousands/µL 2 51   Lymphocytes Absolute Latest Ref Range: 0 60 - 4 47 Thousands/µL 1 42   Absolute Monocytes Latest Ref Range: 0 17 - 1 22 Thousand/µL 0 46   Absolute Eosinophils Latest Ref Range: 0 00 - 0 61 Thousand/µL 0 06   Basophils Absolute Latest Ref Range: 0 00 - 0 10 Thousands/µL 0 03   ESTRADIOL LEVEL Latest Ref Range: 11 0 - 52 5 pg/mL 63 0 (H)   Testosterone, Total, LC/MS Latest Ref Range: 264 - 916 ng/dL 853   TESTOSTERONE FREE Latest Ref Range: 6 6 - 18 1 pg/mL 13 5   TSH 3RD GENERATON Latest Ref Range: 0 358 - 3 740 uIU/mL 1 850   Free T4 Latest Ref Range: 0 76 - 1 46 ng/dL 0 75 (L)   PSA, Total Latest Ref Range: 0 0 - 4 0 ng/mL 0 9     History reviewed  No pertinent surgical history  --> ENT procedures: wisdom teeth extraction    Current Outpatient Medications   Medication Sig Dispense Refill    anastrozole (ARIMIDEX) 1 mg tablet Take 1 tablet (1 mg total) by mouth daily 90 tablet 1    atorvastatin (LIPITOR) 20 mg tablet Take 20 mg by mouth daily      clomiPHENE (CLOMID) 50 mg tablet Take 0 5 tablets (25 mg total) by mouth daily 45 tablet 3    Coenzyme Q10-Vitamin E 100-100 MG-UNIT CAPS Take 100-200 mg by mouth      levothyroxine 50 mcg tablet Take 1 tablet (50 mcg total) by mouth daily 30 tablet 2    lisinopril (ZESTRIL) 20 mg tablet Take 20 mg by mouth daily   LORazepam (ATIVAN) 1 mg tablet Take 1 mg by mouth every 6 (six) hours as needed for anxiety   metoprolol succinate (TOPROL-XL) 25 mg 24 hr tablet TAKE 1 TABLET BY MOUTH EVERY DAY      PARoxetine (PAXIL) 10 mg tablet Take 10 mg by mouth every morning  No current facility-administered medications for this visit  Social History:  -Employment: security work on weekends  -Smoking: denied  -Caffeine: coffee 2 cups after he wakes up    -Alcohol: once in a while   Not common  -THC: denied  -OTC/Supplements/herbals: vitamins and calcium  -Illicits:  denies  -Family: lives alone    ROS:  Genitourinary need to urinate more than twice a night and difficulty with erection   Cardiology palpitations/fluttering feeling in the chest   Gastrointestinal abdominal pain or cramping that disturb sleep    Neurology difficulty with memory   Constitutional fatigue and weight change   Integumentary none   Psychiatry anxiety, depression and aggressiveness or irritability   Musculoskeletal leg cramps   Pulmonary snoring   ENT none   Endocrine frequent urination   Hematological none       MSE:  -Alert and appropriate: alert, calm, cooperative  -Oriented to person, place and time:  name, age, location, day/date/mon/yr  -Behavior: good, sustained eye contact  -Speech: Unremarkable rate/rhythm/volume  -Mood: "so-so"  -Affect: full range  -Thought Processes: linear, logical, goal directed  PE:  Body mass index is 35 57 kg/m²  Vitals:    03/01/19 1500   BP: 132/82   Pulse: 84   Height: 5' 11" (1 803 m)       -General:  In NAD    -Eyes: Conjunctival injection: none     -EOM:  PERRLA, EOMI   -Eyelid hooding: yes    -ENT: MP: 4/4   -Facial deformity: no retrognathia   -Hard palate: moderate arch   -Soft palate: difficult to visualize   -Gums and teeth: normal dentition   -Tongue:  Scalloping   -Nares:  Patent    -Neck/Lymphatics: Lymphadenopathy:  none appreciated   -Masses:  none appreciated   -Circumference: Neck Circumference: 46cm    -Cardiac: Auscultation:  RRR   - LE edema over shins: none appreciated    -Pulm: -Respirations: unlaboured         -Auscultation:  CTA bilaterally, posterior fields    -Neuro: No resting tremor     -Musculoskeletal: Gait and stance: normal turning and ambulation; unremarkable  Assessment:  Mr Saleem Andrade is a 76 y o  male who is seen to evaluate for possible obstructive sleep apnea    Given the patient's symptoms of snoring, nocturnal gasping, daytime tiredness, non-restorative sleep, frequent nocturnal awakenings, and nocturia in the context of a narrow airway, chronic sinus issues, and large neck girth, a diagnosis of obstructive sleep apnea is very likely  The pathophysiology of, the reasons to treat and treatment options for obstructive sleep apnea were all reviewed with the patient today  Discussed the testing options and reviewed the benefits and downsides of both, patient opted for the in lab testing  He is amenable to treatment with PAP therapy  Discussed keeping nasal passages clear, abstaining from alcohol, and other sedating drugs at night- which will worsen symptoms of TRACEY  Poor sleep hygiene and conditioned insomnia  Anxiety is a contributor  Given below recommendations  --History provided by: patient   --Records reviewed: in chart        Recommendations:  1) In lab diagnostic   2) Driving safety was reviewed with patient  If the patient feels too sleepy to drive he knows not to drive  If he becomes sleepy while driving he will pull over and nap  3) Follow-up after initiating treatment  4) Call with any questions or concerns  Insomnia:    1) Get up at the same time seven days out of the week  2) Only go to bed when feeling sleepy  3) Wind down in the evening without electronics  4) Stimulus Control: If lying in bed for 15-20 minutes (estimated because the clock is turned away so you cannot see it) and you are not asleep get up and do something relaxing in a different room (reading a magazine article, solitaire with a deck of cards)  Do this in the middle of the night as well if awake  Avoid doing work or getting on the computer  5) Bedroom for sleep only  No watching TV or using electronics (computer, phone, tablet etc )  in bed  6) Turn clock away so you cannot see it in bed  7) Exercise regularly but try to avoid exercise within 4 hours of bedtime  Morning exercise is best     8) Avoid caffeine in the afternoon    Considering tapering down on caffeine by decreasing by one beverage with caffeine every 3 days until off  9) Avoid smoking near bedtime    10) Avoid alcohol before bed  If you consume one alcoholic beverage allow 3 hours between that drink and bedtime  If you consume two alcoholic beverages allow 5 hours  Between those drinks and bedtime  Alcohol may lead to waking at night  11) Avoid napping except for driving safety  If you feel to sleepy to drive do not drive  If you get sleepy while driving pull over and nap  You may resume driving once you feel alert  12) Read "No More Sleepless Nights" by Karson Guillen PhD     13) There are some on-line resources that do require a fee that can be of help  Two credible websites are as follows:  http://[employer]/cbt-online-insomnia-treatment html  [employer] si  An marina used by the [place] is as follows:  CBT-I   Go! To Sleep by the [facility]  All questions answered for the patient, who indicated understanding and agreed with the plan     Anthony Miranda MD  Psychiatry/ Sleep medicine

## 2019-03-01 NOTE — PATIENT INSTRUCTIONS
Recommendations:  1) HST with in lab PSG if non-diagnostic  2) Driving safety was reviewed with patient  If the patient feels too sleepy to drive he knows not to drive  If he becomes sleepy while driving he will pull over and nap  3) Follow-up after initiating treatment  4) Call with any questions or concerns  Insomnia:    1) Get up at the same time seven days out of the week  2) Only go to bed when feeling sleepy  3) Wind down in the evening without electronics  4) Stimulus Control: If lying in bed for 15-20 minutes (estimated because the clock is turned away so you cannot see it) and you are not asleep get up and do something relaxing in a different room (reading a magazine article, solitaire with a deck of cards)  Do this in the middle of the night as well if awake  Avoid doing work or getting on the computer  5) Bedroom for sleep only  No watching TV or using electronics (computer, phone, tablet etc )  in bed  6) Turn clock away so you cannot see it in bed  7) Exercise regularly but try to avoid exercise within 4 hours of bedtime  Morning exercise is best     8) Avoid caffeine in the afternoon  Considering tapering down on caffeine by decreasing by one beverage with caffeine every 3 days until off  9) Avoid smoking near bedtime    10) Avoid alcohol before bed  If you consume one alcoholic beverage allow 3 hours between that drink and bedtime  If you consume two alcoholic beverages allow 5 hours  Between those drinks and bedtime  Alcohol may lead to waking at night  11) Avoid napping except for driving safety  If you feel to sleepy to drive do not drive  If you get sleepy while driving pull over and nap  You may resume driving once you feel alert  12) Read "No More Sleepless Nights" by Dorothy Her PhD     13) There are some on-line resources that do require a fee that can be of help    Two credible websites are as follows:  http://[employer]/cbt-online-insomnia-treatment html  [employer] si  An marina used by the [address] [facility] is as follows:  CBT-I   Go! To Sleep by the [facility]

## 2019-03-01 NOTE — TELEPHONE ENCOUNTER
Patient reports he appreciated Dr Saniya Martinez teaching and recommendations for TRACEY, but did not appreciate her telling him how to drive   He doesn't need to be told how to drive, he has been driving all his life

## 2019-03-04 ENCOUNTER — TRANSCRIBE ORDERS (OUTPATIENT)
Dept: SLEEP CENTER | Facility: CLINIC | Age: 69
End: 2019-03-04

## 2019-04-01 DIAGNOSIS — E23.0 HYPOGONADOTROPIC HYPOGONADISM (HCC): ICD-10-CM

## 2020-01-29 PROBLEM — E03.9 ACQUIRED HYPOTHYROIDISM: Status: ACTIVE | Noted: 2020-01-29

## 2020-06-29 DIAGNOSIS — E23.0 HYPOGONADOTROPIC HYPOGONADISM (HCC): ICD-10-CM

## 2020-07-07 ENCOUNTER — TELEPHONE (OUTPATIENT)
Dept: ENDOCRINOLOGY | Facility: CLINIC | Age: 70
End: 2020-07-07

## 2020-07-07 NOTE — TELEPHONE ENCOUNTER
Patient did not show up for [facility] endocrinology follow up visit  Called patient and left message to return call to complete or reschedule visit

## 2020-08-13 ENCOUNTER — TELEMEDICINE (OUTPATIENT)
Dept: ENDOCRINOLOGY | Facility: CLINIC | Age: 70
End: 2020-08-13
Payer: COMMERCIAL

## 2020-08-13 ENCOUNTER — TRANSCRIBE ORDERS (OUTPATIENT)
Dept: SLEEP CENTER | Facility: CLINIC | Age: 70
End: 2020-08-13

## 2020-08-13 DIAGNOSIS — E78.5 DYSLIPIDEMIA: ICD-10-CM

## 2020-08-13 DIAGNOSIS — E23.0 HYPOGONADOTROPIC HYPOGONADISM (HCC): Primary | ICD-10-CM

## 2020-08-13 DIAGNOSIS — G47.33 OBSTRUCTIVE SLEEP APNEA: ICD-10-CM

## 2020-08-13 DIAGNOSIS — N62 GYNECOMASTIA: ICD-10-CM

## 2020-08-13 DIAGNOSIS — E78.5 HYPERLIPIDEMIA, UNSPECIFIED HYPERLIPIDEMIA TYPE: ICD-10-CM

## 2020-08-13 DIAGNOSIS — E03.9 ACQUIRED HYPOTHYROIDISM: ICD-10-CM

## 2020-08-13 DIAGNOSIS — R73.01 IMPAIRED FASTING GLUCOSE: ICD-10-CM

## 2020-08-13 DIAGNOSIS — E55.9 VITAMIN D DEFICIENCY: ICD-10-CM

## 2020-08-13 PROCEDURE — G2012 BRIEF CHECK IN BY MD/QHP: HCPCS | Performed by: PHYSICIAN ASSISTANT

## 2020-08-13 NOTE — PROGRESS NOTES
Virtual Brief Visit    Assessment/Plan:    Problem List Items Addressed This Visit        Endocrine    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[facility] [address] ) - Primary     He has been off the clomiphene the past few months  Complete lab testing as ordered  Relevant Orders    Testosterone, free, total    Estradiol    Ambulatory referral to Sleep Medicine    Acquired hypothyroidism     Continue levothyroxine  Complete lab testing as ordered  Relevant Orders    TSH, 3rd generation    T4, free       Respiratory    Obstructive sleep apnea     He has not yet seen sleep specialist, will place another referral           Relevant Orders    Ambulatory referral to Sleep Medicine       Other    Vitamin D deficiency     Check Vitamin D level  Relevant Orders    Vitamin D 25 hydroxy    Gynecomastia     He reports that this has been stable and not a problem  Will evaluate at next in person visit  Relevant Orders    Estradiol    Hyperlipidemia     Continue Statin  Check CMP/Fasting Lipid Panel  LFTs elevated on lab testing in 2019- Dr Nikita Marcus ordered liver ultrasound this hasn't been completed  Will order additional testing if liver function tests remain abnormal   He has an appt with family physician tomorrow as well  Other Visit Diagnoses     Impaired fasting glucose        Relevant Orders    Hemoglobin A1C    Comprehensive metabolic panel    Dyslipidemia        Relevant Orders    Comprehensive metabolic panel    Lipid Panel with Direct LDL reflex                Reason for visit is   Chief Complaint   Patient presents with    Virtual Brief Visit        Encounter provider Sade Corrales PA-C    Provider located at [address] [place] [address] Drive [id number]    Recent Visits  No visits were found meeting these conditions     Showing recent visits within past 7 days and meeting all other requirements Today's Visits  Date Type Provider Dept   08/13/20 Telemedicine Wendy Vasquez PA-C [facility] [address]   Showing today's visits and meeting all other requirements     Future Appointments  No visits were found meeting these conditions  Showing future appointments within next 150 days and meeting all other requirements        After connecting through telephone, the patient was identified by name and date of birth  Reji Javed was informed that this is a telemedicine visit and that the visit is being conducted through telephone  My office door was closed  No one else was in the room  He acknowledged consent and understanding of privacy and security of the platform  The patient has agreed to participate and understands he can discontinue the visit at any time  It was my intent to perform this visit via video technology but the patient was not able to do a video connection so the visit was completed via audio telephone only  Patient is aware this is a billable service  Subjective    Reji Javed is a 79 y o  male with history of hypogonadism  He was treated with clomiphene for the past few years but has been out of medication the past few months  He is taking arimidex about every other day  He reports gynecomastia has been stable and not problematic  He hasn't had any lab testing since 2 2019  He is concerned about going to lab due to COVID-19  He is taking medications for hypothyroidism, HTN, and Hyperlipidemia and hasn't had any lab testing  Reports continued fatigue and sleep disturbances  Was referred to sleep specialist at previous visit, He did see sleep specialist and suspected to have TRACEY, but hasn't followed through with sleep testing  Karrie Nissen HPI     Past Medical History:   Diagnosis Date    Anxiety     Hypertension        History reviewed  No pertinent surgical history      Current Outpatient Medications   Medication Sig Dispense Refill    anastrozole (ARIMIDEX) 1 mg tablet Take 1 tablet (1 mg total) by mouth daily 90 tablet 1    atorvastatin (LIPITOR) 20 mg tablet Take 20 mg by mouth daily      clomiPHENE (CLOMID) 50 mg tablet Take 0 5 tablets (25 mg total) by mouth daily 45 tablet 3    Coenzyme Q10-Vitamin E 100-100 MG-UNIT CAPS Take 100-200 mg by mouth      levothyroxine 50 mcg tablet Take 1 tablet (50 mcg total) by mouth daily 30 tablet 2    lisinopril (ZESTRIL) 20 mg tablet Take 20 mg by mouth daily   LORazepam (ATIVAN) 1 mg tablet Take 1 mg by mouth every 6 (six) hours as needed for anxiety   metoprolol succinate (TOPROL-XL) 25 mg 24 hr tablet TAKE 1 TABLET BY MOUTH EVERY DAY      PARoxetine (PAXIL) 10 mg tablet Take 10 mg by mouth every morning  No current facility-administered medications for this visit  No Known Allergies    Review of Systems   Constitutional: Positive for fatigue  Negative for activity change and appetite change  HENT: Negative for sore throat, trouble swallowing and voice change  Eyes: Negative for visual disturbance  Respiratory: Negative for choking, chest tightness and shortness of breath  Cardiovascular: Negative for chest pain, palpitations and leg swelling  Gastrointestinal: Negative for abdominal pain, constipation and diarrhea  Endocrine: Negative for cold intolerance, heat intolerance, polydipsia, polyphagia and polyuria  Genitourinary: Negative for frequency  Musculoskeletal: Negative for arthralgias and myalgias  Skin: Negative for rash  Neurological: Negative for dizziness and syncope  Hematological: Negative for adenopathy  Psychiatric/Behavioral: Positive for sleep disturbance  All other systems reviewed and are negative  There were no vitals filed for this visit        I spent 15 minutes with patient today in which greater than 50% of the time was spent in counseling/coordination of care regarding review of previous lab results, Need for repeat lab results, discussed options for getting labs done if he is not comfortable with goign to lab and provided information for mobile lab testing  VIRTUAL VISIT DISCLAIMER    Rosaura Henley acknowledges that he has consented to an online visit or consultation  He understands that the online visit is based solely on information provided by him, and that, in the absence of a face-to-face physical evaluation by the physician, the diagnosis he receives is both limited and provisional in terms of accuracy and completeness  This is not intended to replace a full medical face-to-face evaluation by the physician  Rosaura Henley understands and accepts these terms

## 2020-08-13 NOTE — ASSESSMENT & PLAN NOTE
Continue Statin  Check CMP/Fasting Lipid Panel  LFTs elevated on lab testing in 2019- Dr Farrukh Corbin ordered liver ultrasound this hasn't been completed  Will order additional testing if liver function tests remain abnormal   He has an appt with family physician tomorrow as well

## 2020-08-14 ENCOUNTER — TELEPHONE (OUTPATIENT)
Dept: ENDOCRINOLOGY | Facility: CLINIC | Age: 70
End: 2020-08-14

## 2020-08-14 NOTE — TELEPHONE ENCOUNTER
Patient called back and will wait further down the road until he feels more comfortable going for sleep sttudy    thankyou

## 2020-09-09 ENCOUNTER — TELEPHONE (OUTPATIENT)
Dept: LAB | Facility: HOSPITAL | Age: 70
End: 2020-09-09

## 2020-09-16 ENCOUNTER — TELEPHONE (OUTPATIENT)
Dept: LAB | Facility: HOSPITAL | Age: 70
End: 2020-09-16

## 2020-09-23 ENCOUNTER — APPOINTMENT (OUTPATIENT)
Dept: LAB | Facility: HOSPITAL | Age: 70
End: 2020-09-23
Payer: COMMERCIAL

## 2020-09-23 DIAGNOSIS — E23.0 HYPOGONADOTROPIC HYPOGONADISM (HCC): ICD-10-CM

## 2020-09-23 PROCEDURE — 84403 ASSAY OF TOTAL TESTOSTERONE: CPT

## 2020-09-23 PROCEDURE — 84402 ASSAY OF FREE TESTOSTERONE: CPT

## 2020-09-24 LAB
TESTOST FREE SERPL-MCNC: 6.2 PG/ML (ref 6.6–18.1)
TESTOST SERPL-MCNC: 286 NG/DL (ref 264–916)

## 2020-09-28 DIAGNOSIS — E23.0 HYPOGONADOTROPIC HYPOGONADISM (HCC): ICD-10-CM

## 2020-09-28 DIAGNOSIS — N62 GYNECOMASTIA: ICD-10-CM

## 2020-09-28 RX ORDER — ANASTROZOLE 1 MG/1
1 TABLET ORAL DAILY
Qty: 90 TABLET | Refills: 0 | Status: SHIPPED | OUTPATIENT
Start: 2020-09-28 | End: 2020-10-01 | Stop reason: ALTCHOICE

## 2020-09-29 ENCOUNTER — TELEPHONE (OUTPATIENT)
Dept: ENDOCRINOLOGY | Facility: CLINIC | Age: 70
End: 2020-09-29

## 2020-09-29 NOTE — TELEPHONE ENCOUNTER
Patient would like a call back to discuss some more questions he has regarding medications and holding off until he sees Dr Nataliia Dugan  He did not go into much detail with me  He said he is very paranoid about coming to the office about an appt because of COVID  I did explain that some insurance companies are no longer offering virtual visit, so he was going to check with his and call us back to see if he can be seen virtually instead  I did also advise him that he can always send a [employer] message to the physician regarding any questions he may have  He is having issues logging in but I provided him with the number to get his MyChart fixed  Please call him to discuss  Thanks!

## 2020-09-30 ENCOUNTER — TELEPHONE (OUTPATIENT)
Dept: ENDOCRINOLOGY | Facility: CLINIC | Age: 70
End: 2020-09-30

## 2020-09-30 NOTE — TELEPHONE ENCOUNTER
Patient is calling about his meds and asking if you would be able to give him a call   He has questions about both of them that are rx'd     Thank you

## 2020-09-30 NOTE — TELEPHONE ENCOUNTER
Can you let him know I got his message and I Will call him back- I don't think there is any problem with starting the medications, but I want to double check with Dr Jiles Bumpers before calling him and I haven't had the chance since he is in interviews all day

## 2020-10-01 ENCOUNTER — TELEPHONE (OUTPATIENT)
Dept: ENDOCRINOLOGY | Facility: CLINIC | Age: 70
End: 2020-10-01

## 2020-10-01 DIAGNOSIS — E23.0 HYPOGONADOTROPIC HYPOGONADISM (HCC): Primary | ICD-10-CM

## 2020-10-01 DIAGNOSIS — R73.01 IMPAIRED FASTING GLUCOSE: ICD-10-CM

## 2020-10-01 NOTE — TELEPHONE ENCOUNTER
Spoke with patient by phone regarding meds/recent MI (he reports was managed medically)  He has been off the clomiphene and the arimedex the past few months  Advised him to remain off medication  Focus on lifestyle modification and weight loss  Repeat A1C, CMP, Testosterone in 3 months    Clerical Team-- please cancel November appt with Dr Lucy Montesinos, mail labs in 3 months and schedule follow up with Dr Lucy Montesinos after that   Thank You

## 2020-10-11 ENCOUNTER — TELEPHONE (OUTPATIENT)
Dept: OTHER | Facility: OTHER | Age: 70
End: 2020-10-11

## 2020-11-02 ENCOUNTER — TELEPHONE (OUTPATIENT)
Dept: ENDOCRINOLOGY | Facility: CLINIC | Age: 70
End: 2020-11-02

## 2021-08-09 ENCOUNTER — OFFICE VISIT (OUTPATIENT)
Dept: ENDOCRINOLOGY | Facility: CLINIC | Age: 71
End: 2021-08-09
Payer: COMMERCIAL

## 2021-08-09 VITALS
HEART RATE: 65 BPM | DIASTOLIC BLOOD PRESSURE: 80 MMHG | BODY MASS INDEX: 33.6 KG/M2 | HEIGHT: 71 IN | WEIGHT: 240 LBS | SYSTOLIC BLOOD PRESSURE: 130 MMHG

## 2021-08-09 DIAGNOSIS — G47.33 OBSTRUCTIVE SLEEP APNEA: ICD-10-CM

## 2021-08-09 DIAGNOSIS — E23.0 HYPOGONADOTROPIC HYPOGONADISM (HCC): ICD-10-CM

## 2021-08-09 DIAGNOSIS — N62 GYNECOMASTIA: ICD-10-CM

## 2021-08-09 DIAGNOSIS — E78.5 HYPERLIPIDEMIA, UNSPECIFIED HYPERLIPIDEMIA TYPE: ICD-10-CM

## 2021-08-09 DIAGNOSIS — E55.9 VITAMIN D DEFICIENCY: ICD-10-CM

## 2021-08-09 DIAGNOSIS — R73.01 IMPAIRED FASTING GLUCOSE: Primary | ICD-10-CM

## 2021-08-09 DIAGNOSIS — E03.9 ACQUIRED HYPOTHYROIDISM: ICD-10-CM

## 2021-08-09 PROCEDURE — 99214 OFFICE O/P EST MOD 30 MIN: CPT | Performed by: PHYSICIAN ASSISTANT

## 2021-08-09 RX ORDER — AMLODIPINE BESYLATE 10 MG/1
10 TABLET ORAL DAILY
COMMUNITY
Start: 2020-09-10 | End: 2021-09-10

## 2021-08-09 RX ORDER — CLOPIDOGREL BISULFATE 75 MG/1
75 TABLET ORAL DAILY
COMMUNITY
Start: 2020-09-10 | End: 2021-09-10

## 2021-08-09 RX ORDER — ASPIRIN 81 MG/1
81 TABLET ORAL DAILY
COMMUNITY
Start: 2020-09-10 | End: 2021-11-04

## 2021-08-09 NOTE — PROGRESS NOTES
Established Patient Progress Note       Chief Complaint   Patient presents with    Hypogonadism    Hypothyroidism        History of Present Illness:     Douglas Garcia is a 70 y o  male with a history of hypogonadotrophic hypogonadism, Hypothyroidism, and gynecomastia  He also has Vitamin D Deficiency and occasionally takes vitamin D supplement  Has not been to the office since 2019  Had telemed visit/labs in 2020  Since last visit had NSTEMI about 1 year ago, managed medically  He has been off the clomiphene, arimidex, and levothyroxine  He is feeling tired and has ED  He attributes some of the symptoms to medications (toprol and paxil) He would like to get back on meds  Since MI he did lose about 30 pounds, gained 10 back  He exercises 2-3x per week  Diet is OK but needs to reduce portion size and stop eating at night  He has prediabetes and blood sugars are variable in the 100s when he checks  Patient Active Problem List   Diagnosis    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[facility] [address]    Obstructive sleep apnea    Vitamin D deficiency    Gynecomastia    Acquired hypothyroidism    Hyperlipidemia    Impaired fasting glucose      Past Medical History:   Diagnosis Date    Anxiety     Hypertension       No past surgical history on file  No family history on file    Social History     Tobacco Use    Smoking status: Never Smoker    Smokeless tobacco: Never Used   Substance Use Topics    Alcohol use: Yes     Comment: occasionally     No Known Allergies    Current Outpatient Medications:     amLODIPine (NORVASC) 10 mg tablet, Take 10 mg by mouth daily, Disp: , Rfl:     aspirin (ECOTRIN LOW STRENGTH) 81 mg EC tablet, Take 81 mg by mouth daily, Disp: , Rfl:     atorvastatin (LIPITOR) 20 mg tablet, Take 80 mg by mouth daily , Disp: , Rfl:     clopidogrel (PLAVIX) 75 mg tablet, Take 75 mg by mouth daily, Disp: , Rfl:     Coenzyme Q10-Vitamin E 100-100 MG-UNIT CAPS, Take 100-200 mg by mouth, Disp: , Rfl:    lisinopril (ZESTRIL) 20 mg tablet, Take 40 mg by mouth daily , Disp: , Rfl:     LORazepam (ATIVAN) 1 mg tablet, Take 1 mg by mouth every 6 (six) hours as needed for anxiety  , Disp: , Rfl:     metoprolol succinate (TOPROL-XL) 25 mg 24 hr tablet, 100 mg , Disp: , Rfl:     PARoxetine (PAXIL) 10 mg tablet, Take 40 mg by mouth every morning , Disp: , Rfl:     Review of Systems   Constitutional: Positive for fatigue  Negative for activity change and appetite change  HENT: Negative for sore throat, trouble swallowing and voice change  Eyes: Negative for visual disturbance  Respiratory: Negative for choking, chest tightness and shortness of breath  Cardiovascular: Negative for chest pain, palpitations and leg swelling  Gastrointestinal: Negative for abdominal pain, constipation and diarrhea  Endocrine: Negative for cold intolerance, heat intolerance, polydipsia, polyphagia and polyuria  Genitourinary: Negative for frequency  Musculoskeletal: Negative for arthralgias and myalgias  Skin: Negative for rash  Neurological: Negative for dizziness and syncope  Hematological: Negative for adenopathy  Psychiatric/Behavioral: Negative for sleep disturbance  All other systems reviewed and are negative  Physical Exam:  Body mass index is 33 47 kg/m²  /80 (BP Location: Right arm, Patient Position: Sitting)   Pulse 65   Ht 5' 11" (1 803 m)   Wt 109 kg (240 lb)   BMI 33 47 kg/m²    Wt Readings from Last 3 Encounters:   08/09/21 109 kg (240 lb)   08/17/16 116 kg (255 lb)   03/30/15 112 kg (248 lb)       Physical Exam  Vitals reviewed  Constitutional:       General: He is not in acute distress  Appearance: He is well-developed  HENT:      Head: Normocephalic and atraumatic  Eyes:      Conjunctiva/sclera: Conjunctivae normal       Pupils: Pupils are equal, round, and reactive to light  Neck:      Thyroid: No thyromegaly     Cardiovascular:      Rate and Rhythm: Normal rate and regular rhythm  Heart sounds: Normal heart sounds  No murmur heard  Pulmonary:      Effort: Pulmonary effort is normal  No respiratory distress  Breath sounds: Normal breath sounds  No wheezing or rales  Chest:      Chest wall: Swelling (b/l mild gynecomastia vs fatty tissue) present  Abdominal:      General: Bowel sounds are normal  There is no distension  Palpations: Abdomen is soft  Tenderness: There is no abdominal tenderness  Musculoskeletal:         General: Normal range of motion  Cervical back: Normal range of motion and neck supple  Lymphadenopathy:      Cervical: No cervical adenopathy  Skin:     General: Skin is warm and dry  Neurological:      Mental Status: He is alert and oriented to person, place, and time  Labs:     Component      Latest Ref Rng & Units 9/23/2020 9/23/2020 9/23/2020           8:40 AM  8:40 AM  8:40 AM   Sodium      136 - 145 mmol/L   137   Potassium      3 5 - 5 3 mmol/L   4 6   Chloride      100 - 108 mmol/L   103   CO2      21 - 32 mmol/L   33 (H)   Anion Gap      4 - 13 mmol/L   1 (L)   BUN      5 - 25 mg/dL   18   Creatinine      0 60 - 1 30 mg/dL   1 05   GLUCOSE FASTING      65 - 99 mg/dL   95   Calcium      8 3 - 10 1 mg/dL   10 0   AST      5 - 45 U/L   33   ALT      12 - 78 U/L   51   Alkaline Phosphatase      46 - 116 U/L   114   Total Protein      6 4 - 8 2 g/dL   7 9   Albumin      3 5 - 5 0 g/dL   3 8   TOTAL BILIRUBIN      0 20 - 1 00 mg/dL   0 59   eGFR      ml/min/1 73sq m   72   Cholesterol      50 - 200 mg/dL      Triglycerides      <=150 mg/dL      HDL      >=40 mg/dL      LDL Calculated      0 - 100 mg/dL      Hemoglobin A1C      Normal 3 8-5 6%; PreDiabetic 5 7-6 4%;  Diabetic >=6 5%; Glycemic control for adults with diabetes <7 0% % 6 4 (H)     eAG, EST AVG Glucose      mg/dl 137     TESTOSTERONE FREE      6 6 - 18 1 pg/mL      Testosterone, Total, LC/MS      264 - 916 ng/dL      TSH 3RD GENERATON      0 358 - 3 740 uIU/mL      Free T4      0 76 - 1 46 ng/dL      Vit D, 25-Hydroxy      30 0 - 100 0 ng/mL  47 7    ESTRADIOL LEVEL      11 0 - 52 5 pg/mL        Component      Latest Ref Rng & Units 9/23/2020 9/23/2020 9/23/2020           8:40 AM  8:40 AM  8:40 AM   Sodium      136 - 145 mmol/L      Potassium      3 5 - 5 3 mmol/L      Chloride      100 - 108 mmol/L      CO2      21 - 32 mmol/L      Anion Gap      4 - 13 mmol/L      BUN      5 - 25 mg/dL      Creatinine      0 60 - 1 30 mg/dL      GLUCOSE FASTING      65 - 99 mg/dL      Calcium      8 3 - 10 1 mg/dL      AST      5 - 45 U/L      ALT      12 - 78 U/L      Alkaline Phosphatase      46 - 116 U/L      Total Protein      6 4 - 8 2 g/dL      Albumin      3 5 - 5 0 g/dL      TOTAL BILIRUBIN      0 20 - 1 00 mg/dL      eGFR      ml/min/1 73sq m      Cholesterol      50 - 200 mg/dL 103     Triglycerides      <=150 mg/dL 88     HDL      >=40 mg/dL 32 (L)     LDL Calculated      0 - 100 mg/dL 53     Hemoglobin A1C      Normal 3 8-5 6%; PreDiabetic 5 7-6 4%;  Diabetic >=6 5%; Glycemic control for adults with diabetes <7 0% %      eAG, EST AVG Glucose      mg/dl      TESTOSTERONE FREE      6 6 - 18 1 pg/mL      Testosterone, Total, LC/MS      264 - 916 ng/dL      TSH 3RD GENERATON      0 358 - 3 740 uIU/mL  1 900    Free T4      0 76 - 1 46 ng/dL   0 90   Vit D, 25-Hydroxy      30 0 - 100 0 ng/mL      ESTRADIOL LEVEL      11 0 - 52 5 pg/mL        Component      Latest Ref Rng & Units 9/23/2020 9/23/2020           8:40 AM  8:40 AM   Sodium      136 - 145 mmol/L     Potassium      3 5 - 5 3 mmol/L     Chloride      100 - 108 mmol/L     CO2      21 - 32 mmol/L     Anion Gap      4 - 13 mmol/L     BUN      5 - 25 mg/dL     Creatinine      0 60 - 1 30 mg/dL     GLUCOSE FASTING      65 - 99 mg/dL     Calcium      8 3 - 10 1 mg/dL     AST      5 - 45 U/L     ALT      12 - 78 U/L     Alkaline Phosphatase      46 - 116 U/L     Total Protein      6 4 - 8 2 g/dL     Albumin      3 5 - 5 0 g/dL     TOTAL BILIRUBIN      0 20 - 1 00 mg/dL     eGFR      ml/min/1 73sq m     Cholesterol      50 - 200 mg/dL     Triglycerides      <=150 mg/dL     HDL      >=40 mg/dL     LDL Calculated      0 - 100 mg/dL     Hemoglobin A1C      Normal 3 8-5 6%; PreDiabetic 5 7-6 4%; Diabetic >=6 5%; Glycemic control for adults with diabetes <7 0% %     eAG, EST AVG Glucose      mg/dl     TESTOSTERONE FREE      6 6 - 18 1 pg/mL  6 2 (L)   Testosterone, Total, LC/MS      264 - 916 ng/dL  286   TSH 3RD GENERATON      0 358 - 3 740 uIU/mL     Free T4      0 76 - 1 46 ng/dL     Vit D, 25-Hydroxy      30 0 - 100 0 ng/mL     ESTRADIOL LEVEL      11 0 - 52 5 pg/mL 17 0          Impression & Plan:    Problem List Items Addressed This Visit        Endocrine    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[facility] [address] )    Relevant Orders    CBC and differential Lab Collect    PSA, total screen Lab Collect    Estradiol    Testosterone, free, total    Acquired hypothyroidism     Complete lab testing then will determine need for levothyroxine  Relevant Orders    TSH, 3rd generation Lab Collect    T4, free Lab Collect    Impaired fasting glucose - Primary     Complete lab testing  Focus on lifestyle modification/weight loss  Relevant Orders    HEMOGLOBIN A1C W/ EAG ESTIMATION Lab Collect    Comprehensive metabolic panel Lab Collect       Respiratory    Obstructive sleep apnea       Other    Vitamin D deficiency    Relevant Orders    Vitamin D 25 hydroxy Lab Collect    Gynecomastia     Complete lab testing  Relevant Orders    Estradiol    Hyperlipidemia    Relevant Orders    TSH, 3rd generation Lab Collect    Lipid Panel with Direct LDL reflex          Orders Placed This Encounter   Procedures    CBC and differential Lab Collect     This is a patient instruction: This test is non-fasting  Please drink two glasses of water morning of bloodwork          Standing Status:   Future     Standing Expiration Date:   8/9/2022   Ruthie Renner HEMOGLOBIN A1C W/ EAG ESTIMATION Lab Collect     Standing Status:   Future     Standing Expiration Date:   8/9/2022    Comprehensive metabolic panel Lab Collect     This is a patient instruction: Patient fasting for 8 hours or longer recommended  Standing Status:   Future     Standing Expiration Date:   8/9/2022    TSH, 3rd generation Lab Collect     This is a patient instruction: This test is non-fasting  Please drink two glasses of water morning of bloodwork  Standing Status:   Future     Standing Expiration Date:   8/9/2022    T4, free Lab Collect     Standing Status:   Future     Standing Expiration Date:   8/9/2022    Vitamin D 25 hydroxy Lab Collect     Standing Status:   Future     Standing Expiration Date:   8/9/2022    PSA, total screen Lab Collect     This is a patient instruction: This test is non-fasting  Please drink two glasses of water morning of bloodwork  Standing Status:   Future     Standing Expiration Date:   8/9/2022    Estradiol     Standing Status:   Future     Standing Expiration Date:   8/9/2022    Lipid Panel with Direct LDL reflex     This is a patient instruction: This test requires patient fasting for 10-12 hours or longer  Drinking of black coffee or black tea is acceptable  Standing Status:   Future     Standing Expiration Date:   2/9/2022    Testosterone, free, total     This is a patient instruction: Fasting preferred  Collections for men not undergoing treatment must be completed between 7am-9am ONLY  Collection time restrictions are not applicable to women or men already undergoing treatment  Standing Status:   Future     Standing Expiration Date:   2/9/2022       There are no Patient Instructions on file for this visit  Discussed with the patient and all questioned fully answered  He will call me if any problems arise  Follow-up appointment in 3 months       Counseled patient on diagnostic results, prognosis, risk and benefit of treatment options, instruction for management, importance of treatment compliance, Risk  factor reduction and impressions      Prashanth PresidentALVA

## 2021-08-10 ENCOUNTER — TELEPHONE (OUTPATIENT)
Dept: ENDOCRINOLOGY | Facility: CLINIC | Age: 71
End: 2021-08-10

## 2021-08-10 NOTE — TELEPHONE ENCOUNTER
Patient has a lot of questions about the lab test ordered especially with the TIMES they are to be done

## 2021-08-11 ENCOUNTER — LAB (OUTPATIENT)
Dept: LAB | Age: 71
End: 2021-08-11
Payer: COMMERCIAL

## 2021-08-11 DIAGNOSIS — E23.0 HYPOGONADOTROPIC HYPOGONADISM (HCC): ICD-10-CM

## 2021-08-11 DIAGNOSIS — E03.9 ACQUIRED HYPOTHYROIDISM: ICD-10-CM

## 2021-08-11 DIAGNOSIS — R73.01 IMPAIRED FASTING GLUCOSE: ICD-10-CM

## 2021-08-11 DIAGNOSIS — N62 GYNECOMASTIA: ICD-10-CM

## 2021-08-11 DIAGNOSIS — E78.5 HYPERLIPIDEMIA, UNSPECIFIED HYPERLIPIDEMIA TYPE: ICD-10-CM

## 2021-08-11 DIAGNOSIS — E55.9 VITAMIN D DEFICIENCY: ICD-10-CM

## 2021-08-11 LAB
25(OH)D3 SERPL-MCNC: 37.3 NG/ML (ref 30–100)
ALBUMIN SERPL BCP-MCNC: 3.9 G/DL (ref 3.5–5)
ALP SERPL-CCNC: 96 U/L (ref 46–116)
ALT SERPL W P-5'-P-CCNC: 51 U/L (ref 12–78)
ANION GAP SERPL CALCULATED.3IONS-SCNC: 2 MMOL/L (ref 4–13)
AST SERPL W P-5'-P-CCNC: 27 U/L (ref 5–45)
BASOPHILS # BLD AUTO: 0.03 THOUSANDS/ΜL (ref 0–0.1)
BASOPHILS NFR BLD AUTO: 1 % (ref 0–1)
BILIRUB SERPL-MCNC: 0.68 MG/DL (ref 0.2–1)
BUN SERPL-MCNC: 15 MG/DL (ref 5–25)
CALCIUM SERPL-MCNC: 9.6 MG/DL (ref 8.3–10.1)
CHLORIDE SERPL-SCNC: 105 MMOL/L (ref 100–108)
CHOLEST SERPL-MCNC: 141 MG/DL (ref 50–200)
CO2 SERPL-SCNC: 30 MMOL/L (ref 21–32)
CREAT SERPL-MCNC: 0.91 MG/DL (ref 0.6–1.3)
EOSINOPHIL # BLD AUTO: 0.11 THOUSAND/ΜL (ref 0–0.61)
EOSINOPHIL NFR BLD AUTO: 2 % (ref 0–6)
ERYTHROCYTE [DISTWIDTH] IN BLOOD BY AUTOMATED COUNT: 12.9 % (ref 11.6–15.1)
EST. AVERAGE GLUCOSE BLD GHB EST-MCNC: 123 MG/DL
ESTRADIOL SERPL-MCNC: 21 PG/ML (ref 11–52.5)
GFR SERPL CREATININE-BSD FRML MDRD: 84 ML/MIN/1.73SQ M
GLUCOSE P FAST SERPL-MCNC: 105 MG/DL (ref 65–99)
HBA1C MFR BLD: 5.9 %
HCT VFR BLD AUTO: 46.8 % (ref 36.5–49.3)
HDLC SERPL-MCNC: 39 MG/DL
HGB BLD-MCNC: 15.1 G/DL (ref 12–17)
IMM GRANULOCYTES # BLD AUTO: 0.01 THOUSAND/UL (ref 0–0.2)
IMM GRANULOCYTES NFR BLD AUTO: 0 % (ref 0–2)
LDLC SERPL CALC-MCNC: 86 MG/DL (ref 0–100)
LYMPHOCYTES # BLD AUTO: 1.6 THOUSANDS/ΜL (ref 0.6–4.47)
LYMPHOCYTES NFR BLD AUTO: 31 % (ref 14–44)
MCH RBC QN AUTO: 27.1 PG (ref 26.8–34.3)
MCHC RBC AUTO-ENTMCNC: 32.3 G/DL (ref 31.4–37.4)
MCV RBC AUTO: 84 FL (ref 82–98)
MONOCYTES # BLD AUTO: 0.45 THOUSAND/ΜL (ref 0.17–1.22)
MONOCYTES NFR BLD AUTO: 9 % (ref 4–12)
NEUTROPHILS # BLD AUTO: 2.97 THOUSANDS/ΜL (ref 1.85–7.62)
NEUTS SEG NFR BLD AUTO: 57 % (ref 43–75)
NRBC BLD AUTO-RTO: 0 /100 WBCS
PLATELET # BLD AUTO: 186 THOUSANDS/UL (ref 149–390)
PMV BLD AUTO: 11 FL (ref 8.9–12.7)
POTASSIUM SERPL-SCNC: 4.6 MMOL/L (ref 3.5–5.3)
PROT SERPL-MCNC: 7.9 G/DL (ref 6.4–8.2)
PSA SERPL-MCNC: 0.5 NG/ML (ref 0–4)
RBC # BLD AUTO: 5.57 MILLION/UL (ref 3.88–5.62)
SODIUM SERPL-SCNC: 137 MMOL/L (ref 136–145)
T4 FREE SERPL-MCNC: 0.76 NG/DL (ref 0.76–1.46)
TRIGL SERPL-MCNC: 81 MG/DL
TSH SERPL DL<=0.05 MIU/L-ACNC: 2.3 UIU/ML (ref 0.36–3.74)
WBC # BLD AUTO: 5.17 THOUSAND/UL (ref 4.31–10.16)

## 2021-08-11 PROCEDURE — 83036 HEMOGLOBIN GLYCOSYLATED A1C: CPT

## 2021-08-11 PROCEDURE — 84443 ASSAY THYROID STIM HORMONE: CPT

## 2021-08-11 PROCEDURE — 36415 COLL VENOUS BLD VENIPUNCTURE: CPT

## 2021-08-11 PROCEDURE — G0103 PSA SCREENING: HCPCS

## 2021-08-11 PROCEDURE — 84402 ASSAY OF FREE TESTOSTERONE: CPT

## 2021-08-11 PROCEDURE — 85025 COMPLETE CBC W/AUTO DIFF WBC: CPT

## 2021-08-11 PROCEDURE — 84439 ASSAY OF FREE THYROXINE: CPT

## 2021-08-11 PROCEDURE — 80061 LIPID PANEL: CPT

## 2021-08-11 PROCEDURE — 84403 ASSAY OF TOTAL TESTOSTERONE: CPT

## 2021-08-11 PROCEDURE — 82670 ASSAY OF TOTAL ESTRADIOL: CPT

## 2021-08-11 PROCEDURE — 80053 COMPREHEN METABOLIC PANEL: CPT

## 2021-08-11 PROCEDURE — 82306 VITAMIN D 25 HYDROXY: CPT

## 2021-08-11 NOTE — TELEPHONE ENCOUNTER
Please let him know to do labs fasting x at least 8 hours  Try to get labs done early morning 7-9 AM if possible- if he can't do that just go when he wakes up or the best he can do get done in the morning

## 2021-08-12 ENCOUNTER — TELEPHONE (OUTPATIENT)
Dept: ENDOCRINOLOGY | Facility: CLINIC | Age: 71
End: 2021-08-12

## 2021-08-12 LAB
TESTOST FREE SERPL-MCNC: 9.3 PG/ML (ref 6.6–18.1)
TESTOST SERPL-MCNC: 341 NG/DL (ref 264–916)

## 2021-08-12 NOTE — TELEPHONE ENCOUNTER
I just commented on the result note  Everything looks OK for now and the A1C has improved, thyroid function normal    Available in [facility] if he wants to review all the specific numbers  I am with patients all afternoon, and off weds next week 8/18     We will talk next week when we get testosterone results

## 2021-08-18 ENCOUNTER — TELEPHONE (OUTPATIENT)
Dept: ENDOCRINOLOGY | Facility: HOSPITAL | Age: 71
End: 2021-08-18

## 2021-08-18 NOTE — TELEPHONE ENCOUNTER
Received call from patient  He would be interested in setting up an appointment with you, please review records as he is a current [facility] patient  He is questioning if you would consider prescribing clomid and anastrozole  He states he had been on these medications in the past  Please advise

## 2021-08-19 ENCOUNTER — TELEPHONE (OUTPATIENT)
Dept: ENDOCRINOLOGY | Facility: CLINIC | Age: 71
End: 2021-08-19

## 2021-08-19 NOTE — TELEPHONE ENCOUNTER
Patient called back  He is asking if you prescribe any aromatase inhibitors  He is also asking if based on his records you would prescribe clomid  I can tell him he will need an appointment to discuss further if you would like

## 2021-08-19 NOTE — TELEPHONE ENCOUNTER
Dr Heidy Silveira- I spoke with you about this patient yesterday  He wants you to see the info he sent in media  Thanks

## 2021-11-04 ENCOUNTER — OFFICE VISIT (OUTPATIENT)
Dept: CARDIOLOGY CLINIC | Facility: CLINIC | Age: 71
End: 2021-11-04
Payer: COMMERCIAL

## 2021-11-04 VITALS
HEIGHT: 71 IN | WEIGHT: 239 LBS | SYSTOLIC BLOOD PRESSURE: 132 MMHG | DIASTOLIC BLOOD PRESSURE: 52 MMHG | HEART RATE: 63 BPM | BODY MASS INDEX: 33.46 KG/M2

## 2021-11-04 DIAGNOSIS — E78.00 PURE HYPERCHOLESTEROLEMIA: Primary | ICD-10-CM

## 2021-11-04 PROBLEM — I25.10 ATHEROSCLEROSIS OF NATIVE CORONARY ARTERY OF NATIVE HEART: Status: ACTIVE | Noted: 2021-11-04

## 2021-11-04 PROCEDURE — 99204 OFFICE O/P NEW MOD 45 MIN: CPT | Performed by: INTERNAL MEDICINE

## 2021-11-04 PROCEDURE — 93000 ELECTROCARDIOGRAM COMPLETE: CPT | Performed by: INTERNAL MEDICINE

## 2021-11-04 RX ORDER — VITAMIN B COMPLEX
1 CAPSULE ORAL DAILY
COMMUNITY

## 2021-11-04 RX ORDER — CLOPIDOGREL BISULFATE 75 MG/1
75 TABLET ORAL DAILY
COMMUNITY
Start: 2021-09-09

## 2021-11-04 RX ORDER — AMLODIPINE BESYLATE 10 MG/1
10 TABLET ORAL DAILY
COMMUNITY
Start: 2021-08-12 | End: 2022-08-12

## 2021-11-04 RX ORDER — MULTIVIT WITH MINERALS/LUTEIN
1000 TABLET ORAL DAILY
COMMUNITY

## 2021-11-04 RX ORDER — UREA 10 %
1000 LOTION (ML) TOPICAL 2 TIMES DAILY
COMMUNITY

## 2023-01-13 ENCOUNTER — TELEPHONE (OUTPATIENT)
Dept: PSYCHIATRY | Facility: CLINIC | Age: 73
End: 2023-01-13

## 2023-01-13 NOTE — TELEPHONE ENCOUNTER
Patient has been added to the Non referral wait list  Confirmed insurance, needs of service, and location preferences         Pt stated his insurance only covers [facility] or [place] or Dr candelaria [employer]PO

## 2023-03-17 ENCOUNTER — TELEPHONE (OUTPATIENT)
Dept: UROLOGY | Facility: AMBULATORY SURGERY CENTER | Age: 73
End: 2023-03-17

## 2023-03-17 NOTE — TELEPHONE ENCOUNTER
Please Triage  New Patient    What is the reason for the patient’s appointment? Pt has been put on medication by other urologist forlow testosterone but he was given medication that increased Estrogen and he wants to know if there is something that can lower his estrogen he has a reading at 65  What office location does the patient prefer? [place]    Imaging/Lab Results: Labs     Do we accept the patient's insurance or is the patient Self-Pay? Insurance Provider: [employer]   Plan Type/Number:  Member ID#: Has the patient had any previous Urologist(s)? Kika Peters in [place]     Have patient records been requested? If not are records showing in Epic: epic     Has the patient had any outside testing done? Does the patient have a personal history of cancer?  No      Pt can be reached at [phone]

## 2023-03-20 ENCOUNTER — TELEPHONE (OUTPATIENT)
Dept: ENDOCRINOLOGY | Facility: CLINIC | Age: 73
End: 2023-03-20

## 2023-03-20 NOTE — TELEPHONE ENCOUNTER
Pt isn't sure he needs an appointment he wants to know if we prescribe Anastrogol  He said it would be a waste of time to come into the office if this is something we don't do  Please review and advice      Pt can be reached at [phone]

## 2023-03-20 NOTE — TELEPHONE ENCOUNTER
Pt called asking if Dr Etelvina Mcarthur would be willing to prescribe Arimidex  Said that he sees a urologist that prescribed him Chlomid, but has seen rising estrogen levels lately  Doesn't look like a pt of Dr Etelvina Mcarthur  Please advise

## 2023-03-20 NOTE — TELEPHONE ENCOUNTER
Please advise  Looks like patient was seen at [facility] and was requesting Anastrozole    Looks at note from [facility]  Do we prescribe this?

## 2023-03-22 NOTE — TELEPHONE ENCOUNTER
Everything will be reviewed at time of patients visit  Provider unable to make any suggestions until he sees the patient

## 2023-03-22 NOTE — TELEPHONE ENCOUNTER
Pt called in to sched Endo appt in April  Pt wanted to make sure we would see him with his medical history  Please call if we are unable to

## 2023-04-27 ENCOUNTER — OFFICE VISIT (OUTPATIENT)
Dept: ENDOCRINOLOGY | Facility: CLINIC | Age: 73
End: 2023-04-27

## 2023-04-27 VITALS
WEIGHT: 250 LBS | SYSTOLIC BLOOD PRESSURE: 140 MMHG | OXYGEN SATURATION: 98 % | BODY MASS INDEX: 34.87 KG/M2 | DIASTOLIC BLOOD PRESSURE: 80 MMHG | HEART RATE: 66 BPM | TEMPERATURE: 97.9 F

## 2023-04-27 DIAGNOSIS — E66.01 MORBID OBESITY (HCC): ICD-10-CM

## 2023-04-27 DIAGNOSIS — E23.0 HYPOGONADOTROPIC HYPOGONADISM (HCC): Primary | ICD-10-CM

## 2023-04-27 DIAGNOSIS — E55.9 VITAMIN D DEFICIENCY: ICD-10-CM

## 2023-04-27 DIAGNOSIS — G47.33 OBSTRUCTIVE SLEEP APNEA: ICD-10-CM

## 2023-04-27 RX ORDER — LISINOPRIL 40 MG/1
40 TABLET ORAL DAILY
COMMUNITY
Start: 2023-03-07

## 2023-04-27 RX ORDER — AMLODIPINE BESYLATE 10 MG/1
10 TABLET ORAL DAILY
COMMUNITY
Start: 2023-02-19

## 2023-04-27 RX ORDER — CLOMIPHENE CITRATE 50 MG/1
50 TABLET ORAL EVERY OTHER DAY
COMMUNITY
Start: 2023-03-22

## 2023-04-27 NOTE — PROGRESS NOTES
Follow-up Patient Progress Note      CC: Hypogonadism    History of Present Illness:   66-year-old male with obesity, HTN, HLD, paroxysmal SVT, TRACEY, CAD s/p MI 9/2/2020, prediabetes, vitamin D deficiency, hypogonadotropic hypogonadism  He presents for consideration of Arimidex prescription in addition to his current use of Clomid  He was originally diagnosed in 2015 by urology Dr Kamryn De Leon in [place] and started on Clomid with improvement in testosterone levels from 234-645 mg/dL  He transitioned his care to Dr Jay Buenrostro in 2019 and continued Clomid and Arimidex  Prior testosterone use was not helpful  Recent PSA was normal   Total and free testosterone levels were 819 mg/dL and 107 8 pg/mL respectively  Estradiol was 65 9 pg/mL  Hematocrit 44 9%  Albumin 4 2 g/dL  Lipid profile is normal     He is asymptomatic at this time  Patient Active Problem List   Diagnosis   • 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[facility] [address] )   • Obstructive sleep apnea   • Vitamin D deficiency   • Gynecomastia   • Acquired hypothyroidism   • Hyperlipidemia   • Impaired fasting glucose   • Atherosclerosis of native coronary artery of native heart     Past Medical History:   Diagnosis Date   • Anxiety    • Hypertension       No past surgical history on file  No family history on file    Social History     Tobacco Use   • Smoking status: Never   • Smokeless tobacco: Never   Substance Use Topics   • Alcohol use: Yes     Comment: occasionally     No Known Allergies      Current Outpatient Medications:   •  Ascorbic Acid (vitamin C) 1000 MG tablet, Take 1,000 mg by mouth daily, Disp: , Rfl:   •  aspirin (ECOTRIN LOW STRENGTH) 81 mg EC tablet, Take 81 mg by mouth daily Every other day, Disp: , Rfl:   •  atorvastatin (LIPITOR) 20 mg tablet, Take 80 mg by mouth daily , Disp: , Rfl:   •  b complex vitamins capsule, Take 1 capsule by mouth daily, Disp: , Rfl:   •  clopidogrel (PLAVIX) 75 mg tablet, Take 75 mg by mouth daily, Disp: , Rfl:   • Coenzyme Q10-Vitamin E 100-100 MG-UNIT CAPS, Take 100-200 mg by mouth, Disp: , Rfl:   •  lisinopril (ZESTRIL) 20 mg tablet, Take 40 mg by mouth daily , Disp: , Rfl:   •  LORazepam (ATIVAN) 1 mg tablet, Take 1 mg by mouth every 6 (six) hours as needed for anxiety  , Disp: , Rfl:   •  magnesium gluconate (MAGONATE) 500 mg tablet, Take 1,000 mg by mouth 2 (two) times a day, Disp: , Rfl:   •  metoprolol succinate (TOPROL-XL) 25 mg 24 hr tablet, 200 mg Taking 200 mg, Disp: , Rfl:   •  PARoxetine (PAXIL) 10 mg tablet, Take 30 mg by mouth every morning , Disp: , Rfl:   •  vitamin E, tocopherol, 1,000 units capsule, Take 1,000 Units by mouth daily, Disp: , Rfl:     Review of Systems   Constitutional: Positive for activity change and appetite change  HENT: Negative  Eyes: Negative  Respiratory: Negative  Cardiovascular: Negative for chest pain  Gastrointestinal: Negative  Endocrine: Negative  Genitourinary: Negative  Musculoskeletal: Negative  Skin: Negative  Allergic/Immunologic: Negative  Neurological: Negative  Hematological: Negative  Psychiatric/Behavioral: Negative  All other systems reviewed and are negative  Physical Exam:  There is no height or weight on file to calculate BMI  There were no vitals taken for this visit  There were no vitals filed for this visit  Physical Exam  Constitutional:       General: He is not in acute distress  Appearance: He is well-developed  He is not ill-appearing  HENT:      Head: Normocephalic and atraumatic  Nose: Nose normal       Mouth/Throat:      Pharynx: Oropharynx is clear  Eyes:      Extraocular Movements: Extraocular movements intact  Conjunctiva/sclera: Conjunctivae normal    Neck:      Thyroid: No thyromegaly  Cardiovascular:      Rate and Rhythm: Normal rate  Pulmonary:      Effort: Pulmonary effort is normal    Musculoskeletal:         General: No deformity  Cervical back: Normal range of motion  Skin:     Capillary Refill: Capillary refill takes less than 2 seconds  Coloration: Skin is not pale  Findings: No rash  Neurological:      Mental Status: He is alert and oriented to person, place, and time  Psychiatric:         Behavior: Behavior normal          Labs:   Lab Results   Component Value Date    HGBA1C 6 1 (H) 09/07/2022       Lab Results   Component Value Date    MOP3AGWBHTAL 2 300 08/11/2021       Lab Results   Component Value Date    CREATININE 0 91 08/11/2021    CREATININE 1 05 09/23/2020    CREATININE 1 00 02/13/2019    BUN 15 08/11/2021     (L) 11/19/2014    K 4 6 08/11/2021     08/11/2021    CO2 30 08/11/2021     eGFR   Date Value Ref Range Status   08/11/2021 84 ml/min/1 73sq m Final       Lab Results   Component Value Date    ALT 51 08/11/2021    AST 27 08/11/2021    ALKPHOS 96 08/11/2021    BILITOT 0 38 11/19/2014       Lab Results   Component Value Date    CHOLESTEROL 141 08/11/2021    CHOLESTEROL 103 09/23/2020    CHOLESTEROL 192 02/13/2019     Lab Results   Component Value Date    HDL 39 (L) 08/11/2021    HDL 32 (L) 09/23/2020    HDL 35 (L) 02/13/2019     Lab Results   Component Value Date    TRIG 81 08/11/2021    TRIG 88 09/23/2020    TRIG 103 02/13/2019     Lab Results   Component Value Date    [place] 157 02/13/2019         Impression:  1  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[facility] [address] )    2  Vitamin D deficiency    3  Morbid obesity ([address] )    4  Obstructive sleep apnea         Plan:    Diagnoses and all orders for this visit:    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[address] )  He has been on treatment with Clomid 50 mg every other day off label for many years associated with good response with testosterone in the 800 ng/dL range  He requested for an aromatase inhibitor prescription to reduce risk of cardiovascular disease and other thromboembolic risk due to elevated Estrogen      Today we discussed all aspects of hypogonadism including normal physiology, pathophysiology, contributing systemic conditions, common symptoms, management principles, complications of treatment, treatment contracts for testosterone replacement and goals of therapy  I explained that my typical practice and using clomiphene citrate for hypogonadotropic hypogonadism is to preserve fertility  My goal total testosterone level for his age would be 300 to 400 ng/dL  In principle, since estrogen has to be produced after aromatization of testosterone, better management approach would be to reduce the dose of Clomid then to add Arimidex  We discussed the effect of Zuclomiphene versus Enclomiphene components of the Clomid  Other antiestrogen choices could be letrozole or danazol in theory but I am not aware of adequate data  I would also not be able to prescribe this outside of the guidelines  Patient voiced understanding  I offered him advice through [address] but there is no need for arranging follow-up appointment  Vitamin D deficiency  Advised vitamin D3 5000 IU daily OTC  Morbid obesity ([address] )  Advised changes to diet and lifestyle modifications  Carb restriction and total calorie restriction for the day to less than 1800  Obstructive sleep apnea  Advised to consider CPAP  I have spent 50 minutes with patient today in which greater than 50% of this time was spent in counseling/coordination of care  Discussed with the patient and all questioned fully answered  He will call me if any problems arise  Educated/ Counseled patient on diagnostic test results, prognosis, risk vs benefit of treatment options, importance of treatment compliance, healthy life and lifestyle choices        [address]

## 2023-05-02 ENCOUNTER — TELEPHONE (OUTPATIENT)
Dept: ENDOCRINOLOGY | Facility: CLINIC | Age: 73
End: 2023-05-02

## 2023-05-02 NOTE — TELEPHONE ENCOUNTER
Dr Dontae Hodge told pt he would put some extra information on myChart for him about Enclomiphene, but it was never put on there  Please advise

## 2023-05-22 ENCOUNTER — TELEPHONE (OUTPATIENT)
Dept: CARDIOLOGY CLINIC | Facility: CLINIC | Age: 73
End: 2023-05-22

## 2024-09-06 ENCOUNTER — TELEPHONE (OUTPATIENT)
Age: 74
End: 2024-09-06

## 2024-09-06 NOTE — TELEPHONE ENCOUNTER
Contacted patient off of Talk Therapy  and NON-REFERRAL wait list to verify needs of services in attempts to update list with patient preferences. LVM for patient to contact intake dept  in regards to wait list       PATIENT REMOVED FROM LIST DUE TO UNABLE TO CONTACT PATIENT AFTER 2ND ATTEMPT

## 2024-09-17 ENCOUNTER — TELEPHONE (OUTPATIENT)
Age: 74
End: 2024-09-17

## 2024-09-17 NOTE — TELEPHONE ENCOUNTER
"Behavioral Health Outpatient Intake Questions    Referred By   : Self    Please advise interviewee that they need to answer all questions truthfully to allow for best care, and any misrepresentations of information may affect their ability to be seen at this clinic   => Was this discussed? Yes     Behavioral Health Outpatient Intake History -     Presenting Problem (in patient's own words): Anxiety/worries, financial struggles stress    Are there any communication barriers for this patient?     Yes, a little of dyslexia.                                                 Are you taking any psychiatric medications? Yes     If \"YES\" -What are they Paxil, Ativan      If \"YES\" -Who prescribes? PCP    Has the Patient previously received outpatient Talk Therapy or Medication Management from [facility]  No         If \"NO\" -Has Patient received these services elsewhere? Seen an psychiatrist and therapist years ago outside network.     Has the Patient abused alcohol or other substances in the last 6 months ? No  No concerns of substance abuse are reported.    Legal History-     Is this treatment court ordered? No     Has the Patient been convicted of a felony?  No    ACCEPTED as a patient Yes  If \"Yes\" Appointment Date: Current openings did not work for patient at this time, pt will remain on [facility] until openings in [place].    Referred Elsewhere? No    Name of Insurance Co:[facility] [employer]  Insurance ID#[id number]  Insurance Phone #[phone]    "

## 2024-09-17 NOTE — TELEPHONE ENCOUNTER
Contacted patient off of Talk Therapy  to verify needs of services in attempts to offer patient an appointment. Spoke with patient and completed intake, when scheduling pt did not want to schedule months out and at [facility] location. Pt is aware will remain on [facility] until openings in [place] occur.